# Patient Record
Sex: MALE | Race: WHITE | Employment: FULL TIME | URBAN - METROPOLITAN AREA
[De-identification: names, ages, dates, MRNs, and addresses within clinical notes are randomized per-mention and may not be internally consistent; named-entity substitution may affect disease eponyms.]

---

## 2018-06-11 ENCOUNTER — HOSPITAL ENCOUNTER (OUTPATIENT)
Dept: CARDIOLOGY | Age: 58
Discharge: HOME | End: 2018-06-11
Payer: OTHER GOVERNMENT

## 2018-06-11 ENCOUNTER — OFFICE VISIT (OUTPATIENT)
Dept: CARDIOLOGY CLINIC | Age: 58
End: 2018-06-11
Payer: OTHER GOVERNMENT

## 2018-06-11 VITALS
DIASTOLIC BLOOD PRESSURE: 80 MMHG | SYSTOLIC BLOOD PRESSURE: 126 MMHG | HEART RATE: 50 BPM | HEIGHT: 67 IN | BODY MASS INDEX: 46.62 KG/M2 | RESPIRATION RATE: 16 BRPM | WEIGHT: 297 LBS

## 2018-06-11 DIAGNOSIS — I35.8 AORTIC HEART MURMUR: ICD-10-CM

## 2018-06-11 DIAGNOSIS — R00.1 BRADYCARDIA: Primary | Chronic | ICD-10-CM

## 2018-06-11 PROCEDURE — 99213 OFFICE O/P EST LOW 20 MIN: CPT | Performed by: INTERNAL MEDICINE

## 2018-06-11 PROCEDURE — 93000 ELECTROCARDIOGRAM COMPLETE: CPT | Performed by: INTERNAL MEDICINE

## 2018-06-11 PROCEDURE — 93308 TTE F-UP OR LMTD: CPT

## 2018-06-13 ENCOUNTER — TELEPHONE (OUTPATIENT)
Dept: CARDIOLOGY CLINIC | Age: 58
End: 2018-06-13

## 2018-06-13 DIAGNOSIS — I35.0 AORTIC VALVE STENOSIS, ETIOLOGY OF CARDIAC VALVE DISEASE UNSPECIFIED: ICD-10-CM

## 2018-06-13 DIAGNOSIS — R06.02 SHORTNESS OF BREATH: Primary | ICD-10-CM

## 2018-06-18 ENCOUNTER — HOSPITAL ENCOUNTER (OUTPATIENT)
Age: 58
Discharge: HOME | End: 2018-06-18
Payer: OTHER GOVERNMENT

## 2018-06-18 LAB
ANION GAP SERPL CALCULATED.3IONS-SCNC: 13 MMOL/L (ref 7–16)
BUN BLDV-MCNC: 24 MG/DL (ref 6–20)
CALCIUM SERPL-MCNC: 9.4 MG/DL (ref 8.6–10.2)
CHLORIDE BLD-SCNC: 102 MMOL/L (ref 98–107)
CO2: 25 MMOL/L (ref 22–29)
CREAT SERPL-MCNC: 1.3 MG/DL (ref 0.7–1.2)
GFR AFRICAN AMERICAN: >60
GFR NON-AFRICAN AMERICAN: 57 ML/MIN/1.73
GLUCOSE BLD-MCNC: 92 MG/DL (ref 74–109)
POTASSIUM SERPL-SCNC: 4.1 MMOL/L (ref 3.5–5)
PRO-BNP: 114 PG/ML (ref 0–125)
SODIUM BLD-SCNC: 140 MMOL/L (ref 132–146)

## 2018-06-18 PROCEDURE — 80048 BASIC METABOLIC PNL TOTAL CA: CPT

## 2018-06-18 PROCEDURE — 83880 ASSAY OF NATRIURETIC PEPTIDE: CPT

## 2018-06-18 PROCEDURE — 36415 COLL VENOUS BLD VENIPUNCTURE: CPT

## 2018-06-26 ENCOUNTER — TELEPHONE (OUTPATIENT)
Dept: CARDIOLOGY CLINIC | Age: 58
End: 2018-06-26

## 2018-06-26 DIAGNOSIS — I35.0 AORTIC STENOSIS, SEVERE: Primary | ICD-10-CM

## 2018-06-29 ENCOUNTER — TELEPHONE (OUTPATIENT)
Dept: CARDIOLOGY CLINIC | Age: 58
End: 2018-06-29

## 2018-07-05 NOTE — TELEPHONE ENCOUNTER
I gave Dr. Dixon Stands email to [facility] on 6/29/18. She was to give this information to [facility] in echo department.

## 2018-07-12 ENCOUNTER — TELEPHONE (OUTPATIENT)
Dept: CARDIOLOGY CLINIC | Age: 58
End: 2018-07-12

## 2018-08-31 ENCOUNTER — TELEPHONE (OUTPATIENT)
Dept: ADMINISTRATIVE | Age: 58
End: 2018-08-31

## 2018-09-19 NOTE — PROGRESS NOTES
by mouth daily, Disp: , Rfl:     ferrous sulfate 325 (65 Fe) MG tablet, Take 325 mg by mouth daily (with breakfast), Disp: , Rfl:     aspirin 81 MG tablet, Take 81 mg by mouth daily, Disp: , Rfl:     CPAP Machine MISC, by Does not apply route, Disp: , Rfl:     levothyroxine (SYNTHROID) 137 MCG tablet, Take 150 mcg by mouth Daily , Disp: , Rfl:     FLUTICASONE FUROATE NA, 2 sprays by Nasal route daily. , Disp: , Rfl:     rosuvastatin (CRESTOR) 10 MG tablet, Take 10 mg by mouth daily. , Disp: , Rfl:     Cholecalciferol (VITAMIN D) 2000 UNITS CAPS capsule, Take 4,000 capsules by mouth daily , Disp: , Rfl:     albuterol (PROVENTIL) (2.5 MG/3ML) 0.083% nebulizer solution, Take 2.5 mg by nebulization every 6 hours as needed.   , Disp: , Rfl:       Ilya Gallego MD

## 2018-09-24 ENCOUNTER — OFFICE VISIT (OUTPATIENT)
Dept: CARDIOLOGY CLINIC | Age: 58
End: 2018-09-24
Payer: OTHER GOVERNMENT

## 2018-09-24 VITALS
SYSTOLIC BLOOD PRESSURE: 104 MMHG | HEART RATE: 57 BPM | BODY MASS INDEX: 43.41 KG/M2 | DIASTOLIC BLOOD PRESSURE: 62 MMHG | HEIGHT: 67 IN | WEIGHT: 276.6 LBS | RESPIRATION RATE: 18 BRPM

## 2018-09-24 DIAGNOSIS — R00.1 BRADYCARDIA: Primary | Chronic | ICD-10-CM

## 2018-09-24 DIAGNOSIS — Z95.2 S/P AORTIC VALVE REPLACEMENT: ICD-10-CM

## 2018-09-24 DIAGNOSIS — I35.0 AORTIC VALVE STENOSIS, ETIOLOGY OF CARDIAC VALVE DISEASE UNSPECIFIED: Chronic | ICD-10-CM

## 2018-09-24 PROCEDURE — 99213 OFFICE O/P EST LOW 20 MIN: CPT | Performed by: INTERNAL MEDICINE

## 2018-09-24 PROCEDURE — 93000 ELECTROCARDIOGRAM COMPLETE: CPT | Performed by: INTERNAL MEDICINE

## 2018-09-24 RX ORDER — FERROUS SULFATE 325(65) MG
325 TABLET ORAL
COMMUNITY
End: 2018-11-12 | Stop reason: ALTCHOICE

## 2018-09-24 RX ORDER — WARFARIN SODIUM 3 MG/1
3 TABLET ORAL 2 TIMES DAILY
COMMUNITY
Start: 2018-09-12 | End: 2018-11-12 | Stop reason: ALTCHOICE

## 2018-09-24 RX ORDER — ALBUTEROL SULFATE 90 MCG
HFA AEROSOL WITH ADAPTER (GRAM) INHALATION
COMMUNITY
Start: 2018-08-02

## 2018-11-01 NOTE — PROGRESS NOTES
[facility]  Radha Amato M.D. Ericka Jewell. Araceli Stanford M.D. Miriam Ledyardhussein Mendez M.D.  Eda Bence. Sandy Heart M.D. Clemencia Whitaker, Mirela Love M.D. Jesu Humphrey M.D. Oleksandr Hanson   1960  Nenita Betancourt MD    This 30-year-old man is seen for outpatient cardiac follow-up today. He has a history of obesity, hyperlipidemia, as well as valvular/ischemic heart disease. He had CABG and AVR at [facility] 08/29/18. He states he is asymptomatic currently and has been able to perform all his normal activities. Medical History:  1. Hyperlipidemia.    2. Hypothyroidism. 3. Asthma.    4. Vasectomy. 5. Cardiac catheterization [facility], 01/28/2005 (Dr. Rock Sparks). [facility] minimal plaque.  LAD mild lumen irregularities.   CX angiographically unremarkable.  RCA dominant with mild plaque.  No greater than 20% stenosis.   LVEDP 12.   Normal wall motion with EF 60%.    6. History of cigarette abuse, 15 pack years.  Quit 2008.    7. Echo, 11/14/2011.  LA 3.9.  EF 60%.  E/A 1. 18.  E/E' 11.79.  AV mean gradient 24.  NEREIDA 1.6 cm.  Concentric LVH.  Aortic root 3.9.   8. Exercise MPS, 11/21/2011.  14.8 METS, 85% MPHR.  Mild LV dilatation.  No TID.  Small reversible ischemic anteroseptal defect.  EF 63%. 9. Abdominal obesity.   BMI 46.5 to June 2018.      10. Exercise EKG, 02/18/2014.  10.4 METS.  No pain or arrhythmia.  No ST segment depression.  75% MPHR.  EKG indeterminate for ischemia due to submaximal heart rate. 11.  DION. Treated and followed by Dr. Rick Pearl, 06/07/2017. 12. Echo, 05/26/2015.   Mild CLVH.  Normal EF.  Moderately enlarged LA.  Calcific aortic stenosis moderate.   NEREIDA 1.3 cm², mean gradient 28.   Aortic root 4.2.    13. Echo, 08/21/2017. Normal EF. No pericardial effusion. Mild AI. Probably no severe AS. Valve not well demonstrated. EF normal.     14. Echo, 06/12/2018. AV not well demonstrated. AV peak velocity 4.1m/s. AV mean gradient 37 mmHg.  Aortic valve mouth daily, Disp: , Rfl:     aspirin 81 MG tablet, Take 81 mg by mouth daily, Disp: , Rfl:     CPAP Machine MISC, by Does not apply route, Disp: , Rfl:     levothyroxine (SYNTHROID) 137 MCG tablet, Take 150 mcg by mouth Daily , Disp: , Rfl:     FLUTICASONE FUROATE NA, 2 sprays by Nasal route daily. , Disp: , Rfl:     rosuvastatin (CRESTOR) 10 MG tablet, Take 10 mg by mouth daily. , Disp: , Rfl:     Cholecalciferol (VITAMIN D) 2000 UNITS CAPS capsule, Take 4,000 capsules by mouth daily , Disp: , Rfl:     albuterol (PROVENTIL) (2.5 MG/3ML) 0.083% nebulizer solution, Take 2.5 mg by nebulization every 6 hours as needed. , Disp: , Rfl:       Sudhir Gutierrez MD

## 2018-11-12 ENCOUNTER — OFFICE VISIT (OUTPATIENT)
Dept: CARDIOLOGY CLINIC | Age: 58
End: 2018-11-12
Payer: OTHER GOVERNMENT

## 2018-11-12 ENCOUNTER — HOSPITAL ENCOUNTER (OUTPATIENT)
Dept: CARDIOLOGY | Age: 58
Discharge: HOME | End: 2018-11-12
Payer: OTHER GOVERNMENT

## 2018-11-12 VITALS
SYSTOLIC BLOOD PRESSURE: 112 MMHG | RESPIRATION RATE: 12 BRPM | WEIGHT: 267 LBS | HEIGHT: 67 IN | BODY MASS INDEX: 41.91 KG/M2 | HEART RATE: 50 BPM | DIASTOLIC BLOOD PRESSURE: 78 MMHG

## 2018-11-12 DIAGNOSIS — Z95.2 S/P AORTIC VALVE REPLACEMENT: ICD-10-CM

## 2018-11-12 DIAGNOSIS — R00.1 BRADYCARDIA: Primary | Chronic | ICD-10-CM

## 2018-11-12 LAB
LV EF: 60 %
LVEF MODALITY: NORMAL

## 2018-11-12 PROCEDURE — 93306 TTE W/DOPPLER COMPLETE: CPT | Performed by: PSYCHIATRY & NEUROLOGY

## 2018-11-12 PROCEDURE — C8929 TTE W OR WO FOL WCON,DOPPLER: HCPCS | Performed by: PSYCHIATRY & NEUROLOGY

## 2018-11-12 PROCEDURE — 93000 ELECTROCARDIOGRAM COMPLETE: CPT | Performed by: INTERNAL MEDICINE

## 2018-11-12 PROCEDURE — 99213 OFFICE O/P EST LOW 20 MIN: CPT | Performed by: INTERNAL MEDICINE

## 2018-11-14 ENCOUNTER — TELEPHONE (OUTPATIENT)
Dept: CARDIOLOGY CLINIC | Age: 58
End: 2018-11-14

## 2019-11-26 ENCOUNTER — TELEPHONE (OUTPATIENT)
Dept: CARDIOLOGY CLINIC | Age: 59
End: 2019-11-26

## 2022-02-14 ENCOUNTER — APPOINTMENT (OUTPATIENT)
Dept: CT IMAGING | Age: 62
End: 2022-02-14
Payer: OTHER GOVERNMENT

## 2022-02-14 ENCOUNTER — HOSPITAL ENCOUNTER (EMERGENCY)
Age: 62
Discharge: ANOTHER HOSPITAL | End: 2022-02-15
Attending: EMERGENCY MEDICINE
Payer: OTHER GOVERNMENT

## 2022-02-14 DIAGNOSIS — I63.9 ACUTE ISCHEMIC STROKE (HCC): Primary | ICD-10-CM

## 2022-02-14 LAB
ALBUMIN SERPL-MCNC: 4.1 G/DL (ref 3.5–5.2)
ALP BLD-CCNC: 66 U/L (ref 40–129)
ALT SERPL-CCNC: 20 U/L (ref 0–40)
ANION GAP SERPL CALCULATED.3IONS-SCNC: 11 MMOL/L (ref 7–16)
APTT: 28.9 SEC (ref 24.5–35.1)
AST SERPL-CCNC: 16 U/L (ref 0–39)
BASOPHILS ABSOLUTE: 0.04 E9/L (ref 0–0.2)
BASOPHILS RELATIVE PERCENT: 0.5 % (ref 0–2)
BILIRUB SERPL-MCNC: <0.2 MG/DL (ref 0–1.2)
BUN BLDV-MCNC: 20 MG/DL (ref 6–23)
CALCIUM SERPL-MCNC: 9.1 MG/DL (ref 8.6–10.2)
CHLORIDE BLD-SCNC: 101 MMOL/L (ref 98–107)
CO2: 26 MMOL/L (ref 22–29)
CREAT SERPL-MCNC: 1.1 MG/DL (ref 0.7–1.2)
EOSINOPHILS ABSOLUTE: 0.11 E9/L (ref 0.05–0.5)
EOSINOPHILS RELATIVE PERCENT: 1.4 % (ref 0–6)
GFR AFRICAN AMERICAN: >60
GFR NON-AFRICAN AMERICAN: >60 ML/MIN/1.73
GLUCOSE BLD-MCNC: 140 MG/DL (ref 74–99)
HCT VFR BLD CALC: 44.8 % (ref 37–54)
HEMOGLOBIN: 14.6 G/DL (ref 12.5–16.5)
IMMATURE GRANULOCYTES #: 0.02 E9/L
IMMATURE GRANULOCYTES %: 0.3 % (ref 0–5)
INR BLD: 1
LYMPHOCYTES ABSOLUTE: 1.84 E9/L (ref 1.5–4)
LYMPHOCYTES RELATIVE PERCENT: 24.1 % (ref 20–42)
MCH RBC QN AUTO: 33.2 PG (ref 26–35)
MCHC RBC AUTO-ENTMCNC: 32.6 % (ref 32–34.5)
MCV RBC AUTO: 101.8 FL (ref 80–99.9)
METER GLUCOSE: 135 MG/DL (ref 74–99)
MONOCYTES ABSOLUTE: 0.82 E9/L (ref 0.1–0.95)
MONOCYTES RELATIVE PERCENT: 10.8 % (ref 2–12)
NEUTROPHILS ABSOLUTE: 4.79 E9/L (ref 1.8–7.3)
NEUTROPHILS RELATIVE PERCENT: 62.9 % (ref 43–80)
PDW BLD-RTO: 13.1 FL (ref 11.5–15)
PLATELET # BLD: 164 E9/L (ref 130–450)
PMV BLD AUTO: 10.1 FL (ref 7–12)
POTASSIUM SERPL-SCNC: 4.3 MMOL/L (ref 3.5–5)
PROTHROMBIN TIME: 11 SEC (ref 9.3–12.4)
RBC # BLD: 4.4 E12/L (ref 3.8–5.8)
REASON FOR REJECTION: NORMAL
REJECTED TEST: NORMAL
SARS-COV-2, NAAT: NOT DETECTED
SODIUM BLD-SCNC: 138 MMOL/L (ref 132–146)
TOTAL PROTEIN: 6.7 G/DL (ref 6.4–8.3)
TSH SERPL DL<=0.05 MIU/L-ACNC: 1.6 UIU/ML (ref 0.27–4.2)
WBC # BLD: 7.6 E9/L (ref 4.5–11.5)

## 2022-02-14 PROCEDURE — 6360000002 HC RX W HCPCS: Performed by: EMERGENCY MEDICINE

## 2022-02-14 PROCEDURE — 36415 COLL VENOUS BLD VENIPUNCTURE: CPT

## 2022-02-14 PROCEDURE — 93005 ELECTROCARDIOGRAM TRACING: CPT | Performed by: PHYSICIAN ASSISTANT

## 2022-02-14 PROCEDURE — 96366 THER/PROPH/DIAG IV INF ADDON: CPT

## 2022-02-14 PROCEDURE — 85025 COMPLETE CBC W/AUTO DIFF WBC: CPT

## 2022-02-14 PROCEDURE — 87635 SARS-COV-2 COVID-19 AMP PRB: CPT

## 2022-02-14 PROCEDURE — 99448 NTRPROF PH1/NTRNET/EHR 21-30: CPT | Performed by: PSYCHIATRY & NEUROLOGY

## 2022-02-14 PROCEDURE — 70450 CT HEAD/BRAIN W/O DYE: CPT

## 2022-02-14 PROCEDURE — 70496 CT ANGIOGRAPHY HEAD: CPT

## 2022-02-14 PROCEDURE — 96365 THER/PROPH/DIAG IV INF INIT: CPT

## 2022-02-14 PROCEDURE — 85610 PROTHROMBIN TIME: CPT

## 2022-02-14 PROCEDURE — 84443 ASSAY THYROID STIM HORMONE: CPT

## 2022-02-14 PROCEDURE — 70498 CT ANGIOGRAPHY NECK: CPT

## 2022-02-14 PROCEDURE — 99285 EMERGENCY DEPT VISIT HI MDM: CPT

## 2022-02-14 PROCEDURE — 82962 GLUCOSE BLOOD TEST: CPT

## 2022-02-14 PROCEDURE — 0042T CT BRAIN PERFUSION: CPT

## 2022-02-14 PROCEDURE — 96375 TX/PRO/DX INJ NEW DRUG ADDON: CPT

## 2022-02-14 PROCEDURE — 6360000004 HC RX CONTRAST MEDICATION: Performed by: RADIOLOGY

## 2022-02-14 PROCEDURE — 80053 COMPREHEN METABOLIC PANEL: CPT

## 2022-02-14 PROCEDURE — 2580000003 HC RX 258: Performed by: EMERGENCY MEDICINE

## 2022-02-14 PROCEDURE — 85730 THROMBOPLASTIN TIME PARTIAL: CPT

## 2022-02-14 RX ORDER — LEVOTHYROXINE SODIUM 0.15 MG/1
150 TABLET ORAL DAILY
Status: CANCELLED | OUTPATIENT
Start: 2022-02-15

## 2022-02-14 RX ORDER — SODIUM CHLORIDE 9 MG/ML
25 INJECTION, SOLUTION INTRAVENOUS PRN
Status: DISCONTINUED | OUTPATIENT
Start: 2022-02-14 | End: 2022-02-15 | Stop reason: HOSPADM

## 2022-02-14 RX ORDER — SODIUM CHLORIDE 0.9 % (FLUSH) 0.9 %
5-40 SYRINGE (ML) INJECTION PRN
Status: DISCONTINUED | OUTPATIENT
Start: 2022-02-14 | End: 2022-02-15 | Stop reason: HOSPADM

## 2022-02-14 RX ORDER — POLYETHYLENE GLYCOL 3350 17 G/17G
17 POWDER, FOR SOLUTION ORAL DAILY PRN
Status: CANCELLED | OUTPATIENT
Start: 2022-02-14

## 2022-02-14 RX ORDER — HYDRALAZINE HYDROCHLORIDE 20 MG/ML
10 INJECTION INTRAMUSCULAR; INTRAVENOUS EVERY 30 MIN PRN
Status: DISCONTINUED | OUTPATIENT
Start: 2022-02-14 | End: 2022-02-15 | Stop reason: HOSPADM

## 2022-02-14 RX ORDER — ACETAMINOPHEN 650 MG/1
650 SUPPOSITORY RECTAL EVERY 4 HOURS PRN
Status: CANCELLED | OUTPATIENT
Start: 2022-02-14

## 2022-02-14 RX ORDER — SODIUM CHLORIDE 0.9 % (FLUSH) 0.9 %
5-40 SYRINGE (ML) INJECTION EVERY 12 HOURS SCHEDULED
Status: DISCONTINUED | OUTPATIENT
Start: 2022-02-14 | End: 2022-02-15 | Stop reason: HOSPADM

## 2022-02-14 RX ORDER — ACETAMINOPHEN 325 MG/1
650 TABLET ORAL EVERY 4 HOURS PRN
Status: CANCELLED | OUTPATIENT
Start: 2022-02-14

## 2022-02-14 RX ORDER — ALBUTEROL SULFATE 2.5 MG/3ML
2.5 SOLUTION RESPIRATORY (INHALATION) EVERY 6 HOURS PRN
Status: CANCELLED | OUTPATIENT
Start: 2022-02-14

## 2022-02-14 RX ORDER — ROSUVASTATIN CALCIUM 10 MG/1
10 TABLET, COATED ORAL DAILY
Status: CANCELLED | OUTPATIENT
Start: 2022-02-15

## 2022-02-14 RX ORDER — LORAZEPAM 2 MG/ML
2 INJECTION INTRAMUSCULAR ONCE
Status: COMPLETED | OUTPATIENT
Start: 2022-02-14 | End: 2022-02-14

## 2022-02-14 RX ORDER — ASPIRIN 300 MG/1
300 SUPPOSITORY RECTAL DAILY
Status: CANCELLED | OUTPATIENT
Start: 2022-02-15

## 2022-02-14 RX ORDER — DEXTROSE MONOHYDRATE 25 G/50ML
12.5 INJECTION, SOLUTION INTRAVENOUS
Status: ACTIVE | OUTPATIENT
Start: 2022-02-14 | End: 2022-02-14

## 2022-02-14 RX ORDER — ASPIRIN 81 MG/1
81 TABLET ORAL DAILY
Status: CANCELLED | OUTPATIENT
Start: 2022-02-15

## 2022-02-14 RX ORDER — 0.9 % SODIUM CHLORIDE 0.9 %
50 INTRAVENOUS SOLUTION INTRAVENOUS ONCE
Status: COMPLETED | OUTPATIENT
Start: 2022-02-14 | End: 2022-02-14

## 2022-02-14 RX ADMIN — ALTEPLASE 81 MG: KIT at 20:28

## 2022-02-14 RX ADMIN — IOPAMIDOL 110 ML: 755 INJECTION, SOLUTION INTRAVENOUS at 19:28

## 2022-02-14 RX ADMIN — ALTEPLASE 9 MG: KIT at 20:25

## 2022-02-14 RX ADMIN — SODIUM CHLORIDE 50 ML: 9 INJECTION, SOLUTION INTRAVENOUS at 20:30

## 2022-02-14 RX ADMIN — LORAZEPAM 2 MG: 2 INJECTION INTRAMUSCULAR at 19:51

## 2022-02-15 ENCOUNTER — HOSPITAL ENCOUNTER (INPATIENT)
Age: 62
LOS: 1 days | Discharge: HOME | DRG: 062 | End: 2022-02-16
Attending: INTERNAL MEDICINE | Admitting: INTERNAL MEDICINE
Payer: OTHER GOVERNMENT

## 2022-02-15 ENCOUNTER — APPOINTMENT (OUTPATIENT)
Dept: CT IMAGING | Age: 62
DRG: 062 | End: 2022-02-15
Attending: INTERNAL MEDICINE
Payer: OTHER GOVERNMENT

## 2022-02-15 VITALS
BODY MASS INDEX: 43.35 KG/M2 | WEIGHT: 286 LBS | RESPIRATION RATE: 16 BRPM | DIASTOLIC BLOOD PRESSURE: 80 MMHG | HEART RATE: 52 BPM | TEMPERATURE: 97.6 F | OXYGEN SATURATION: 99 % | SYSTOLIC BLOOD PRESSURE: 143 MMHG | HEIGHT: 68 IN

## 2022-02-15 DIAGNOSIS — I63.9 ACUTE ISCHEMIC STROKE (HCC): Primary | ICD-10-CM

## 2022-02-15 LAB
ALBUMIN SERPL-MCNC: 4.1 G/DL (ref 3.5–5.2)
ALP BLD-CCNC: 65 U/L (ref 40–129)
ALT SERPL-CCNC: 18 U/L (ref 0–40)
ANION GAP SERPL CALCULATED.3IONS-SCNC: 14 MMOL/L (ref 7–16)
AST SERPL-CCNC: 20 U/L (ref 0–39)
BASOPHILS ABSOLUTE: 0.04 E9/L (ref 0–0.2)
BASOPHILS RELATIVE PERCENT: 0.5 % (ref 0–2)
BILIRUB SERPL-MCNC: 0.4 MG/DL (ref 0–1.2)
BUN BLDV-MCNC: 16 MG/DL (ref 6–23)
CALCIUM SERPL-MCNC: 9 MG/DL (ref 8.6–10.2)
CHLORIDE BLD-SCNC: 104 MMOL/L (ref 98–107)
CHOLESTEROL, TOTAL: 153 MG/DL (ref 0–199)
CO2: 25 MMOL/L (ref 22–29)
CREAT SERPL-MCNC: 1.1 MG/DL (ref 0.7–1.2)
EKG ATRIAL RATE: 50 BPM
EKG P AXIS: 14 DEGREES
EKG P-R INTERVAL: 254 MS
EKG Q-T INTERVAL: 446 MS
EKG QRS DURATION: 88 MS
EKG QTC CALCULATION (BAZETT): 406 MS
EKG R AXIS: 6 DEGREES
EKG T AXIS: 71 DEGREES
EKG VENTRICULAR RATE: 50 BPM
EOSINOPHILS ABSOLUTE: 0.09 E9/L (ref 0.05–0.5)
EOSINOPHILS RELATIVE PERCENT: 1.2 % (ref 0–6)
GFR AFRICAN AMERICAN: >60
GFR NON-AFRICAN AMERICAN: >60 ML/MIN/1.73
GLUCOSE BLD-MCNC: 131 MG/DL (ref 74–99)
HBA1C MFR BLD: 6 % (ref 4–5.6)
HCT VFR BLD CALC: 43.3 % (ref 37–54)
HDLC SERPL-MCNC: 46 MG/DL
HEMOGLOBIN: 13.8 G/DL (ref 12.5–16.5)
IMMATURE GRANULOCYTES #: 0.03 E9/L
IMMATURE GRANULOCYTES %: 0.4 % (ref 0–5)
LDL CHOLESTEROL CALCULATED: 84 MG/DL (ref 0–99)
LYMPHOCYTES ABSOLUTE: 1.45 E9/L (ref 1.5–4)
LYMPHOCYTES RELATIVE PERCENT: 19.1 % (ref 20–42)
MAGNESIUM: 2 MG/DL (ref 1.6–2.6)
MCH RBC QN AUTO: 32.7 PG (ref 26–35)
MCHC RBC AUTO-ENTMCNC: 31.9 % (ref 32–34.5)
MCV RBC AUTO: 102.6 FL (ref 80–99.9)
MONOCYTES ABSOLUTE: 0.51 E9/L (ref 0.1–0.95)
MONOCYTES RELATIVE PERCENT: 6.7 % (ref 2–12)
NEUTROPHILS ABSOLUTE: 5.46 E9/L (ref 1.8–7.3)
NEUTROPHILS RELATIVE PERCENT: 72.1 % (ref 43–80)
PDW BLD-RTO: 13.2 FL (ref 11.5–15)
PHOSPHORUS: 2.4 MG/DL (ref 2.5–4.5)
PLATELET # BLD: 147 E9/L (ref 130–450)
PMV BLD AUTO: 10 FL (ref 7–12)
POTASSIUM SERPL-SCNC: 3.9 MMOL/L (ref 3.5–5)
RBC # BLD: 4.22 E12/L (ref 3.8–5.8)
SODIUM BLD-SCNC: 143 MMOL/L (ref 132–146)
TOTAL PROTEIN: 6.9 G/DL (ref 6.4–8.3)
TRIGL SERPL-MCNC: 113 MG/DL (ref 0–149)
VLDLC SERPL CALC-MCNC: 23 MG/DL
WBC # BLD: 7.6 E9/L (ref 4.5–11.5)

## 2022-02-15 PROCEDURE — 6370000000 HC RX 637 (ALT 250 FOR IP): Performed by: NURSE PRACTITIONER

## 2022-02-15 PROCEDURE — 6360000002 HC RX W HCPCS: Performed by: SURGERY

## 2022-02-15 PROCEDURE — 2580000003 HC RX 258

## 2022-02-15 PROCEDURE — 84100 ASSAY OF PHOSPHORUS: CPT

## 2022-02-15 PROCEDURE — 93010 ELECTROCARDIOGRAM REPORT: CPT | Performed by: INTERNAL MEDICINE

## 2022-02-15 PROCEDURE — 2000000000 HC ICU R&B

## 2022-02-15 PROCEDURE — APPSS60 APP SPLIT SHARED TIME 46-60 MINUTES: Performed by: NURSE PRACTITIONER

## 2022-02-15 PROCEDURE — 83036 HEMOGLOBIN GLYCOSYLATED A1C: CPT

## 2022-02-15 PROCEDURE — 36415 COLL VENOUS BLD VENIPUNCTURE: CPT

## 2022-02-15 PROCEDURE — 85025 COMPLETE CBC W/AUTO DIFF WBC: CPT

## 2022-02-15 PROCEDURE — 80061 LIPID PANEL: CPT

## 2022-02-15 PROCEDURE — 83735 ASSAY OF MAGNESIUM: CPT

## 2022-02-15 PROCEDURE — 99223 1ST HOSP IP/OBS HIGH 75: CPT | Performed by: CLINICAL NURSE SPECIALIST

## 2022-02-15 PROCEDURE — 80053 COMPREHEN METABOLIC PANEL: CPT

## 2022-02-15 PROCEDURE — 70450 CT HEAD/BRAIN W/O DYE: CPT

## 2022-02-15 RX ORDER — ROSUVASTATIN CALCIUM 10 MG/1
10 TABLET, COATED ORAL DAILY
Status: DISCONTINUED | OUTPATIENT
Start: 2022-02-15 | End: 2022-02-15

## 2022-02-15 RX ORDER — FLUTICASONE PROPIONATE 50 MCG
2 SPRAY, SUSPENSION (ML) NASAL DAILY
Status: DISCONTINUED | OUTPATIENT
Start: 2022-02-15 | End: 2022-02-16 | Stop reason: HOSPADM

## 2022-02-15 RX ORDER — ASPIRIN 81 MG/1
81 TABLET ORAL DAILY
Status: DISCONTINUED | OUTPATIENT
Start: 2022-02-15 | End: 2022-02-16 | Stop reason: HOSPADM

## 2022-02-15 RX ORDER — LORAZEPAM 2 MG/ML
2 INJECTION INTRAMUSCULAR ONCE
Status: COMPLETED | OUTPATIENT
Start: 2022-02-15 | End: 2022-02-15

## 2022-02-15 RX ORDER — CLOPIDOGREL BISULFATE 75 MG/1
75 TABLET ORAL DAILY
Status: DISCONTINUED | OUTPATIENT
Start: 2022-02-15 | End: 2022-02-16 | Stop reason: HOSPADM

## 2022-02-15 RX ORDER — ALBUTEROL SULFATE 2.5 MG/3ML
2.5 SOLUTION RESPIRATORY (INHALATION) EVERY 6 HOURS PRN
Status: DISCONTINUED | OUTPATIENT
Start: 2022-02-15 | End: 2022-02-16 | Stop reason: HOSPADM

## 2022-02-15 RX ORDER — ACETAMINOPHEN 325 MG/1
650 TABLET ORAL EVERY 4 HOURS PRN
Status: DISCONTINUED | OUTPATIENT
Start: 2022-02-15 | End: 2022-02-16 | Stop reason: HOSPADM

## 2022-02-15 RX ORDER — ACETAMINOPHEN 650 MG/1
650 SUPPOSITORY RECTAL EVERY 4 HOURS PRN
Status: DISCONTINUED | OUTPATIENT
Start: 2022-02-15 | End: 2022-02-16 | Stop reason: HOSPADM

## 2022-02-15 RX ORDER — ASPIRIN 300 MG/1
300 SUPPOSITORY RECTAL DAILY
Status: DISCONTINUED | OUTPATIENT
Start: 2022-02-15 | End: 2022-02-16 | Stop reason: HOSPADM

## 2022-02-15 RX ORDER — POLYETHYLENE GLYCOL 3350 17 G/17G
17 POWDER, FOR SOLUTION ORAL DAILY PRN
Status: DISCONTINUED | OUTPATIENT
Start: 2022-02-15 | End: 2022-02-16 | Stop reason: HOSPADM

## 2022-02-15 RX ORDER — LEVOTHYROXINE SODIUM 0.07 MG/1
150 TABLET ORAL DAILY
Status: DISCONTINUED | OUTPATIENT
Start: 2022-02-16 | End: 2022-02-16 | Stop reason: HOSPADM

## 2022-02-15 RX ORDER — ROSUVASTATIN CALCIUM 20 MG/1
40 TABLET, COATED ORAL DAILY
Status: DISCONTINUED | OUTPATIENT
Start: 2022-02-16 | End: 2022-02-16 | Stop reason: HOSPADM

## 2022-02-15 RX ORDER — HYDRALAZINE HYDROCHLORIDE 20 MG/ML
10 INJECTION INTRAMUSCULAR; INTRAVENOUS EVERY 10 MIN PRN
Status: DISCONTINUED | OUTPATIENT
Start: 2022-02-15 | End: 2022-02-16 | Stop reason: HOSPADM

## 2022-02-15 RX ORDER — SODIUM CHLORIDE 0.9 % (FLUSH) 0.9 %
SYRINGE (ML) INJECTION
Status: COMPLETED
Start: 2022-02-15 | End: 2022-02-15

## 2022-02-15 RX ORDER — SENNA PLUS 8.6 MG/1
1 TABLET ORAL NIGHTLY
Status: DISCONTINUED | OUTPATIENT
Start: 2022-02-15 | End: 2022-02-16 | Stop reason: HOSPADM

## 2022-02-15 RX ORDER — POLYETHYLENE GLYCOL 3350 17 G/17G
17 POWDER, FOR SOLUTION ORAL DAILY
Status: DISCONTINUED | OUTPATIENT
Start: 2022-02-15 | End: 2022-02-16 | Stop reason: HOSPADM

## 2022-02-15 RX ADMIN — LORAZEPAM 2 MG: 2 INJECTION INTRAMUSCULAR; INTRAVENOUS at 20:59

## 2022-02-15 RX ADMIN — POTASSIUM & SODIUM PHOSPHATES POWDER PACK 280-160-250 MG 500 MG: 280-160-250 PACK at 15:45

## 2022-02-15 RX ADMIN — SODIUM CHLORIDE, PRESERVATIVE FREE 10 ML: 5 INJECTION INTRAVENOUS at 20:59

## 2022-02-15 ASSESSMENT — PAIN SCALES - GENERAL
PAINLEVEL_OUTOF10: 0

## 2022-02-15 NOTE — ED NOTES
Bed: 05  Expected date:   Expected time:   Means of arrival:   Comments:  Held triage     Hugo Davalos, KIMBERLY  02/14/22 [id number]

## 2022-02-15 NOTE — ED NOTES
Pt accepted [facility]'s main- [address] [facility] room- nurse to nurse [phone]     Junella Oppenheim, RN  02/15/22 0792

## 2022-02-15 NOTE — ED NOTES
N2N called to [facility], RN.       Sara Lyles RN  02/15/22 1189

## 2022-02-15 NOTE — ED PROVIDER NOTES
Department of Emergency Medicine   ED  Provider Note  Admit Date/RoomTime: 2022  7:07 PM  ED Room:     NAME: Kassy Mitchell  : 1960  MRN: [id number]     Chief Complaint:  Aphasia (20 Mins PTA)    History of Present Illness     LAST KNOWN WELL TIME & DATE: Today @ 6:50 PM       Kassy Mitchell is a 57 y.o. old male who presents to the emergency department for evaluation of strokelike symptoms. He was sitting with his family when suddenly at 6:50 PM, he had right-sided facial droop and slurred speech. His right leg is a bit weak as well. He denies any visual disturbance. No confusion. No head trauma or falls. He does not take any blood thinning medications. He does have a history of CABG several years ago. He states he is not always compliant with his baby aspirin. He has had no recent surgical procedures and no history of intracranial hemorrhage. Code Status on file: No Order. .  ROS   Pertinent positives and negatives are stated within HPI, all other systems reviewed and are negative. Past Medical History:  has a past medical history of Aortic stenosis, Asthma, Bradycardia, Hx of vasectomy, Hyperlipidemia, Hypothyroidism, Sleep apnea, and Thyroid disease. Surgical History:  has a past surgical history that includes Vasectomy. Social History:  reports that he quit smoking about 14 years ago. His smoking use included cigarettes. He has a 7.50 pack-year smoking history. He has never used smokeless tobacco. He reports that he does not drink alcohol and does not use drugs. Family History: family history includes Emphysema in his father; Heart Disease in his mother. Allergies: Patient has no known allergies.     Physical Exam   Oxygen Saturation Interpretation: Normal.        ED Triage Vitals   BP Temp Temp src Pulse Resp SpO2 Height Weight   22 -- 22 -- --   (!) 162/89 96.2 °F (35.7 °C)  53 16 99 % Constitutional:   Level of Consciousness: Awake and alert. ETOH: No.         Distress: none. Cooperativeness: cooperative. Eyes:  PERRL, EOMI, no discharge or conjunctival injection. Ears:  External ears without lesions. Throat:  Pharynx without injection, exudate, or tonsillar hypertrophy. Airway patient. Neck:  Normal ROM. Supple. Respiratory:  Clear to auscultation and breath sounds equal.  CV:  Regular rate and rhythm, normal heart sounds, without pathological murmurs, ectopy, gallops, or rubs. GI: Abdomen Soft, nontender, good bowel sounds. No firm or pulsatile mass. Back:  No costovertebral tenderness. Integument:  Normal turgor. Warm, dry, without visible rash, unless noted elsewhere. Lymphatic: no lymphadenopathy noted  Neurological:       Orientation: person, place and time. Memory:             short term impairment: No.             Long term impairment: No.       CN II-XII: V: Facial sensation:  abnormal - R  VII: Facial strength: abnormal - R. Motor function:            Arm(s): Bilateral normal.            Leg(s): Right weak. Cerebellar function:            Tremor: No.              Past-pointing: No.             Limb Ataxia: No..       Sensory function:            Arm(s): Bilateral normal.            Leg(s): Right diminished. Face: Bilateral normal.    NIH Stroke Scale/Score at time of initial evaluation:  1A: Level of Consciousness 0 - alert; keenly responsive   1B: Ask Month and Age 0 - answers both questions correctly   1C:  Tell Patient To Open and Close Eyes, then Hand  Squeeze 0 - performs both tasks correctly   2: Test Horizontal Extraocular Movements 0 - normal   3: Test Visual Fields 0 - no visual loss   4: Test Facial Palsy 1 - minor paralysis (flattened nasolabial fold, asymmetric on smiling)   5A: Test Left Arm Motor Drift 0 - no drift, limb holds 90 (or 45) degrees for full 10 seconds   5B: Test Right Arm Motor Drift 0 - no Immature Granulocytes # 0.02 E9/L    Lymphocytes Absolute 1.84 1.50 - 4.00 E9/L    Monocytes Absolute 0.82 0.10 - 0.95 E9/L    Eosinophils Absolute 0.11 0.05 - 0.50 E9/L    Basophils Absolute 0.04 0.00 - 0.20 E9/L   Protime-INR   Result Value Ref Range    Protime 11.0 9.3 - 12.4 sec    INR 1.0    APTT   Result Value Ref Range    aPTT 28.9 24.5 - 35.1 sec   SPECIMEN REJECTION   Result Value Ref Range    Rejected Test cmp/tsh     Reason for Rejection see below    Comprehensive metabolic panel   Result Value Ref Range    Sodium 138 132 - 146 mmol/L    Potassium 4.3 3.5 - 5.0 mmol/L    Chloride 101 98 - 107 mmol/L    CO2 26 22 - 29 mmol/L    Anion Gap 11 7 - 16 mmol/L    Glucose 140 (H) 74 - 99 mg/dL    BUN 20 6 - 23 mg/dL    CREATININE 1.1 0.7 - 1.2 mg/dL    GFR Non-African American >60 >=60 mL/min/1.73    GFR African American >60     Calcium 9.1 8.6 - 10.2 mg/dL    Total Protein 6.7 6.4 - 8.3 g/dL    Albumin 4.1 3.5 - 5.2 g/dL    Total Bilirubin <0.2 0.0 - 1.2 mg/dL    Alkaline Phosphatase 66 40 - 129 U/L    ALT 20 0 - 40 U/L    AST 16 0 - 39 U/L   TSH WITHOUT REFLEX   Result Value Ref Range    TSH 1.600 0.270 - 4.200 uIU/mL   POCT Glucose   Result Value Ref Range    Meter Glucose 135 (H) 74 - 99 mg/dL     Imaging: All Radiology results interpreted by Radiologist unless otherwise noted. CTA NECK W CONTRAST   Final Result   CT Head:      No acute intracranial hemorrhage or mass effect. No CT evidence of large acute infarct. CT Perfusion:      There is 38 cc of parenchyma in the left superior posterior frontal lobe with   mild ischemia (T-max > 4 seconds). No evidence of T-max > 6 seconds. No   perfusion evidence of infarct core. CTA Neck:      No hemodynamically significant stenosis or dissection of the cervical   internal carotid arteries. No high-grade stenosis or dissection of the cervical vertebral arteries.       CTA Head:      No evidence of proximal large vessel arterial occlusion or high-grade   stenosis. Critical results were called by Dr. Sylvia Arias MD to Dr. Hans Jimenez on   2/14/2022 at 17:05. Other:      The visualized ascending aorta appears enlarged; consider CT chest for   further evaluation. There has been prior CABG. CTA HEAD W CONTRAST   Final Result   CT Head:      No acute intracranial hemorrhage or mass effect. No CT evidence of large acute infarct. CT Perfusion:      There is 38 cc of parenchyma in the left superior posterior frontal lobe with   mild ischemia (T-max > 4 seconds). No evidence of T-max > 6 seconds. No   perfusion evidence of infarct core. CTA Neck:      No hemodynamically significant stenosis or dissection of the cervical   internal carotid arteries. No high-grade stenosis or dissection of the cervical vertebral arteries. CTA Head:      No evidence of proximal large vessel arterial occlusion or high-grade   stenosis. Critical results were called by Dr. Sylvia Arias MD to Dr. Hans Jimenez on   2/14/2022 at 17:05. Other:      The visualized ascending aorta appears enlarged; consider CT chest for   further evaluation. There has been prior CABG. CT BRAIN PERFUSION   Final Result   CT Head:      No acute intracranial hemorrhage or mass effect. No CT evidence of large acute infarct. CT Perfusion:      There is 38 cc of parenchyma in the left superior posterior frontal lobe with   mild ischemia (T-max > 4 seconds). No evidence of T-max > 6 seconds. No   perfusion evidence of infarct core. CTA Neck:      No hemodynamically significant stenosis or dissection of the cervical   internal carotid arteries. No high-grade stenosis or dissection of the cervical vertebral arteries. CTA Head:      No evidence of proximal large vessel arterial occlusion or high-grade   stenosis. Critical results were called by Dr. Sylvia Arias MD to Dr. Hans Jimenez on   2/14/2022 at 17:05. Other:      The visualized ascending aorta appears enlarged; consider CT chest for   further evaluation. There has been prior CABG. CT HEAD WO CONTRAST   Final Result   CT Head:      No acute intracranial hemorrhage or mass effect. No CT evidence of large acute infarct. CT Perfusion:      There is 38 cc of parenchyma in the left superior posterior frontal lobe with   mild ischemia (T-max > 4 seconds). No evidence of T-max > 6 seconds. No   perfusion evidence of infarct core. CTA Neck:      No hemodynamically significant stenosis or dissection of the cervical   internal carotid arteries. No high-grade stenosis or dissection of the cervical vertebral arteries. CTA Head:      No evidence of proximal large vessel arterial occlusion or high-grade   stenosis. Critical results were called by Dr. Iain Pavon MD to Dr. Nikki Bailey on   2/14/2022 at 17:05. Other:      The visualized ascending aorta appears enlarged; consider CT chest for   further evaluation. There has been prior CABG. EKG #1:  Interpreted by emergency department attending physician unless otherwise noted. 2/14/22  Time: 19:56    Rhythm: normal sinus  and 1 degree AV block  Rate: 50  Axis: normal  Conduction: normal  ST Segments: no acute change  T Waves: non specific changes    Clinical Impression: non-specific EKG  Comparison to Prior tracings:  Today's ECG is unchanged from previous tracings.     ED Course / Medical Decision Making     Medications   sodium chloride flush 0.9 % injection 5-40 mL (has no administration in time range)   sodium chloride flush 0.9 % injection 5-40 mL (has no administration in time range)   0.9 % sodium chloride infusion (has no administration in time range)   dextrose 50 % IV solution (has no administration in time range)   hydrALAZINE (APRESOLINE) injection 10 mg (has no administration in time range)   iopamidol (ISOVUE-370) 76 % injection 110 mL (110 mLs IntraVENous Given 2/14/22 1928)   LORazepam (ATIVAN) injection 2 mg (2 mg IntraVENous Given 2/14/22 1951)   alteplase (ACTIVASE) injection 9 mg (9 mg IntraVENous New Bag 2/14/22 2025)     Followed by   alteplase (ACTIVASE) injection 81 mg (0 mg IntraVENous Stopped 2/14/22 2245)     Followed by   0.9 % sodium chloride bolus (0 mLs IntraVENous Stopped 2/14/22 2315)        Re-Evaluations:  2/14/22      Patients symptoms are improving. 9:30PM    NIH Stroke Scale/Score at time of initial evaluation:  1A: Level of Consciousness 0 - alert; keenly responsive   1B: Ask Month and Age 0 - answers both questions correctly   1C: Tell Patient To Open and Close Eyes, then Hand  Squeeze 0 - performs both tasks correctly   2: Test Horizontal Extraocular Movements 0 - normal   3: Test Visual Fields 0 - no visual loss   4: Test Facial Palsy 0 - normal symmetric movement   5A: Test Left Arm Motor Drift 0 - no drift, limb holds 90 (or 45) degrees for full 10 seconds   5B: Test Right Arm Motor Drift 0 - no drift, limb holds 90 (or 45) degrees for full 10 seconds   6A: Test Left Leg Motor Drift 0 - no drift; leg holds 30 degree position for full 5 seconds   6B: Test Right Leg Motor Drift 0 - no drift; leg holds 30 degree position for full 5 seconds   7: Test Limb Ataxia   (FNF/Heel-Shin) 0 - absent   8: Test Sensation 0 - normal; no sensory loss   9: Test Language/Aphasia 0 - no aphasia, normal   10: Test Dysarthria 0 - normal   11: Test Extinction/Inattention 0 - no abnormality   Total Score: 0     Consultations:             IP CONSULT TO NEUROLOGY  IP CONSULT TO PHARMACY  PHARMACY TO CHANGE BASE FLUIDS    Procedures:   none    MDM: Patient presents to the ED for evaluation of strokelike symptoms. He had sudden onset dysarthria, right-sided facial droop and numbness as well as some right leg weakness. Initial NIH was 4. Stroke team was called and patient taken immediately to CT.   I consulted with the stroke neurologist, please see their separate note. Patient was consented for TPA and it was administered once dry CT read was resulted. On reevaluation, patient symptoms have resolved. Repeat NIH is 0. Patient accepted to ICU at [facility] for further management. CRITICAL CARE:   75 MINUTES. Please note that the withdrawal or failure to initiate urgent interventions for this patient would likely result in a life threatening deterioration or permanent disability. Accordingly this patient received the above mentioned time, excluding separately billable procedures. Plan of Care/Counseling:  Theron Wall DO reviewed today's visit with the patient in addition to providing specific details for the plan of care and counseling regarding the diagnosis and prognosis. Questions are answered at this time and are agreeable with the plan. Assessment      1. Acute ischemic stroke [facility])      This patient's ED course included: a personal history and physicial examination  This patient has remained hemodynamically stable during their ED course. Plan   Admit to Neuro ICU. Patient condition is stable. New Medications     New Prescriptions    No medications on file     Electronically signed by Theron Wall DO   DD: 2/14/22  **This report was transcribed using voice recognition software. Every effort was made to ensure accuracy; however, inadvertent computerized transcription errors may be present.   END OF PROVIDER NOTE        Theron Wall DO  02/14/22 [id number]

## 2022-02-15 NOTE — PROGRESS NOTES
1922 called in stroke alert to access center  [facility] [address] [facility]

## 2022-02-15 NOTE — FLOWSHEET NOTE
Patient arrived from [facility], walked to bed placed on monitor, VSS. Elena DUARTE notified.

## 2022-02-15 NOTE — ED NOTES
Called from ct, pt is claustrophobic. Spoke with dr. Elo Mackenzie- ordered 2mg ativan.  Spoke with patient, gave medication- able to perform the test.     Frederic Willams RN  02/14/22 1950

## 2022-02-15 NOTE — PROGRESS NOTES
Intensive Care Unit  Critical Care Consult  Daily Progress Note 2/15/2022    Date of Admission: 2/15/2022    CC: Critical care management s/p tPA     Hospital Course/ Overnight Events:  57year old male presented to [facility] with strokelike symptoms. R sided facial droop, slurred speech, R leg weakness. Negative for blood thinners. Hx: CABG several years ago. LKW 1858. Stroke alert called 1922, NIH=4, Glucose- 135, /89. tPA given 2025. CT head no acute ICH or mass. CT perfusion- 38 cc of parenchyma L superior posterior frontal lobe with mild ischemia. 2/15 Transferred to [facility]. E's main for further management. Patient with no complaints, walked to bed at 1000. New lab work pending. CT pending. MRI pending. [facility] physician is [facility] Benson. PHYSICAL EXAM:    BP (!) 143/80   Pulse 58   Temp 98.3 °F (36.8 °C) (Oral)   Resp 15   SpO2 98%   No intake or output data in the 24 hours ending 02/15/22 1032     General appearance:  Comfortable. Pain Description: none    GCS:  15  4 - Opens eyes on own   6 - Follows simple motor commands  5 - Alert and oriented x 4     Pupil size: Left 3 mm  Right 3 mm  Pupil reaction: Yes  Wiggles fingers: Left Yes Right Yes  Hand grasp:   Left normal     Right normal  Wiggles toes: Left Yes    Right Yes  Plantar flexion: Left normal    Right normal    CONSTITUTIONAL: No acute distress, lying in hospital bed. NEUROLOGIC: PERRL, oriented x 4  CARDIOVASCULAR: S1 S2, regular rate, regular rhythm, no murmur/gallop/rub. Monitor: SR/SB  PULMONARY: Bilaterally clear, diminished. No rhonchi/rales/wheezes, no use of accessory muscles. RENAL: Voids. ABDOMEN: soft, nontender, nondistended, nontympanic, normal bowel sounds. BM 2/15  MUSCULOSKELETAL: moves all extremities purposefully, 5/5 strength. Chronic pain hips, back, shoulders- retired .    SKIN/EXTREMITIES: no rashes/ecchymosis, no edema/clubbing, warm/dry, good capillary refill     LINES: PIV    CONSULTS: Medicine  Neurology    Past Medical History:   Diagnosis Date    Aortic stenosis 2/18/2013    Asthma     Bradycardia 2/18/2013    Hx of vasectomy     Hyperlipidemia     Hypothyroidism     Sleep apnea     Thyroid disease        ASSESSMENT/PLAN:       · Neuro: Monitor neuro status,  Neurology following, MRI pending. CT scan pending  · CV: No acute issues. Hx Aortic stenosis, Sinus bradycardia with 1st degree AV block , Aortic valve replacement (pig) 2018. HLD. Monitor hemodynamics. BP goal less than 180 mm Hg. PRN hydralazine. Statin. ECHO pending. Lipid panel pending. Metoprolol   · Pulm: No acute issues. Hx: Asthma, Sleep apnea, previous smoker. CPAP at night. Monitor RR & SpO2. Flonase   · GI: No acute issues. Passed swallow. Diet. Monitor bowel function. Speech . Tigan PRN   · Renal: No acute issues. Hx: Vasectomy. Monitor BUN & Cr. Monitor electrolytes & replace as needed. Monitor urine output. Voids    · ID: No acute issues. Monitor WBC  · Endocrine: No acute issues. Hx: hypothyroid. Levothyroxine. A1C pending   · MSK: No acute issues. ROM. turn & reposition. PT/OT . Cane- R side , long distance. · Heme: No acute issues. Monitor CBC. Bowel regimen: Glycolax, Senna    Pain control/Sedation: Tylenol  DVT prophylaxis: SCD  GI prophylaxis: Diet  Ancillary consults:  Neurology, Medicine and Critical Care  Patient/Family update: Patient's wife at the bedside. Questions answered. Support given. Will update when available.     Code status:  Full    Disposition: Admitted to Neuro ICU    Electronically signed by JOHNATHAN Clveeland CNP on 2/15/2022 at 10:32 AM

## 2022-02-15 NOTE — CARE COORDINATION
2/15/2022 - Care Coordination - admitted for acute ischemic stroke. Telestroke consulted and tPA given and then transferred from [facility] for neuology consult. Neurology following. Echo completed. Spoke with pt in room to discuss discharge planning. Pt is a , states he is 100% service connected. He states he served 20 years in the [employer]. He is retired. PCP is Dr Bennie Briceño at the [facility] clinic. Also follow with Dr Shani Bonilla in [facility]. He gets his medications through the [place] and also uses [facility] [facility] in [facility]. Lives with his wife in a ranch style home with 3 steps to enter. Independent in ADLs and drives. Denies hx HHC, AIDE/ARu and DME. His plan is to discharge home when medically stable. His wife will provide transportation at discharge. [address] and left voicemail message for transfer center of pt admission here. SW/CM will follow.

## 2022-02-15 NOTE — VIRTUAL HEALTH
Consults  Patient Location:  [id number] [facility] Emergency Department    Provider Location (City/State): [facility] Stroke and Vascular Neurology Consult for  [address] Stroke Alert through [address] @ 07:25 pm  2/15/2022 3:15 PM  Pt Name: Familia العراقي  MRN: [id number]  [place]: 1960  Date of evaluation: 2/15/2022  Primary Care Physician: Emily Michael MD  Reason for Evaluation: Stroke evaluation with Phone Consult, Discussion and Review of imaging    Familia العراقي is a 57 y.o. male who presents with acute onset right facial droop speech abnormality. Patient was found to have aphasia, right facial asymmetry. LKW: 18:58  NIH:  4    Allergies  has No Known Allergies. Medications  Prior to Admission medications    Medication Sig Start Date End Date Taking? Authorizing Provider   PROVENTIL  (76 Base) MCG/ACT inhaler  8/2/18  Yes Historical Provider, MD   metoprolol tartrate (LOPRESSOR) 25 MG tablet 25 mg 2 times daily  9/12/18  Yes Historical Provider, MD   Multiple Vitamin (MULTI-VITAMIN DAILY PO) Take by mouth daily   Yes Historical Provider, MD   aspirin 81 MG tablet Take 81 mg by mouth daily   Yes Historical Provider, MD   CPAP Machine MISC by Does not apply route   Yes Historical Provider, MD   levothyroxine (SYNTHROID) 137 MCG tablet Take 150 mcg by mouth Daily    Yes Historical Provider, MD   FLUTICASONE FUROATE NA 2 sprays by Nasal route daily. Yes Historical Provider, MD   rosuvastatin (CRESTOR) 10 MG tablet Take 10 mg by mouth daily. Yes Historical Provider, MD   Cholecalciferol (VITAMIN D) 2000 UNITS CAPS capsule Take 4,000 capsules by mouth daily    Yes Historical Provider, MD   albuterol (PROVENTIL) (2.5 MG/3ML) 0.083% nebulizer solution Take 2.5 mg by nebulization every 6 hours as needed.      Yes Historical Provider, MD    Scheduled Meds:  Continuous Infusions:  PRN Meds:.  Past Medical History   has a past medical history of Aortic stenosis, Asthma, Bradycardia, Hx of vasectomy, Hyperlipidemia, Hypothyroidism, Sleep apnea, and Thyroid disease. Social History  Social History     Socioeconomic History    Marital status:      Spouse name: Not on file    Number of children: Not on file    Years of education: Not on file    Highest education level: Not on file   Occupational History    Occupation:     Tobacco Use    Smoking status: Former Smoker     Packs/day: 0.50     Years: 15.00     Pack years: 7.50     Types: Cigarettes     Quit date: 2008     Years since quittin.1    Smokeless tobacco: Never Used   Vaping Use    Vaping Use: Never used   Substance and Sexual Activity    Alcohol use: No    Drug use: No    Sexual activity: Not on file   Other Topics Concern    Not on file   Social History Narrative    Not on file     Social Determinants of Health     Financial Resource Strain:     Difficulty of Paying Living Expenses: Not on file   Food Insecurity:     Worried About [address] in the Last Year: Not on file    [address] in the Last Year: Not on file   Transportation Needs:     Lack of Transportation (Medical): Not on file    Lack of Transportation (Non-Medical):  Not on file   Physical Activity:     Days of Exercise per Week: Not on file    Minutes of Exercise per Session: Not on file   Stress:     Feeling of Stress : Not on file   Social Connections:     Frequency of Communication with Friends and Family: Not on file    Frequency of Social Gatherings with Friends and Family: Not on file    Attends Orthodoxy Services: Not on file    Active Member of Clubs or Organizations: Not on file    Attends Club or Organization Meetings: Not on file    Marital Status: Not on file   Intimate Partner Violence:     Fear of Current or Ex-Partner: Not on file    Emotionally Abused: Not on file    Physically Abused: Not on file   [place] Sexually Abused: Not on file   Housing Stability:     Unable to Pay for Housing in the Last Year: Not on file    Number of [place] in the Last Year: Not on file    Unstable Housing in the Last Year: Not on file     Family History      Problem Relation Age of Onset    Heart Disease Mother     Emphysema Father        OBJECTIVE  BP (!) 143/80   Pulse 52   Temp 97.6 °F (36.4 °C) (Oral)   Resp 16   Ht 5' 8\" (1.727 m)   Wt 286 lb (129.7 kg)   SpO2 99%   BMI 43.49 kg/m²     NIH Stroke Scale  Interval: Reassessment  Level of Consciousness (1a. ): Alert  LOC Questions (1b. ): Answers both correctly  LOC Commands (1c. ): Performs both tasks correctly  Best Gaze (2. ): Normal  Visual (3. ): No visual loss  Facial Palsy (4. ): Normal symmetrical movement  Motor Arm, Left (5a. ): No drift  Motor Arm, Right (5b. ): No drift  Motor Leg, Left (6a. ): No drift  Motor Leg, Right (6b. ): No drift  Limb Ataxia (7. ): Absent  Sensory (8. ): Normal  Best Language (9. ): No aphasia  Dysarthria (10. ): Normal  Extinction and Inattention (11): No abnormality  Total: 0  Pre-Morbid mRS: 0    Imaging:  Images were personally reviewed with VIZ. AI and PACS used to review images including:  CT brain without contrast: No evidence of acute intracranial hemorrhage   CTA imaging: No LVO, No evidence of high grade stenosis     Assessment  58 y/o M with acute onset right facial droop and aphasia  NIHSS was 04   Patient was within tPA window   CT head no e/o hemorrhage   CTA head no LVO      Recommendations:  Patient is an IV-tPA candidate:    No history of recent bleeding, no history of brain bleed/aneurysm/brain tumor, or recent surgery/trauma. IV-tPA Consent: I have informed the patient and/or family of all the associated risks including 6% of sich/death and 1-3% of angioedema, benefits, and alternatives to IV t-PA. The patient and/or family voluntarily consent to the administration of IV t-PA.     --Admit the patient to an intensive care or stroke unit for monitoring. --If the patient develops severe headache, acute hypertension, nausea, or vomiting or has a worsening neurological examination, discontinue the infusion (if IV rtPA is being administered) and obtain emergent CT scan. --Measure blood pressure and perform neurological assessments every 15 minutes during and after IV rtPA infusion for 2 hours, then every 30 minutes for 6 hours, then hourly until 24 hours after IV rtPA treatment. --Increase the frequency of blood pressure measurements if systolic blood pressure is >180 mmHg or if diastolic blood pressure is >105 mmHg; administer antihypertensive medications to maintain blood pressure at or below these levels  --Delay placement of nasogastric tubes, indwelling bladder catheters, or intra-arterial pressure catheters if the patient can be safely managed without them. --Obtain a follow-up CT or MRI scan at 24 hours after IV rtPA before starting anticoagulants or antiplatelet agents. No antiplatelet agent or SQ heparinoids for 24 hrs. --Keep HOB < 15 degrees  --Keep NPO unless passes bedside dysphagia screen or swallow evaluation.   --Obtain MRI brain w/o contrast, MRA or CTA of head and neck (carotid u/s if MRA and CTA not available), transthoracic echo-to r/o structural heart disease that can lead to source of emboli, fasting lipid panel, HgbA1c.   --Smoking cessation education and nicotine patch if indicated  --IVF with NS @ 100cc per hour  --PT/OT/SLP    - If stroke, initiate antiplatelet therapy (ASA 325mg po qday or Plavix 75mg po qday) after 24 hrs s/p TPA and imaging confirming no hemorrhagic transformation  - If stroke/TIA due to large vessel severe INTRACRANIAL stenosis: Consider  mg + Plavix 75 mg x 90 days, then ASA 81 mg or Plavix ALONE; administer after 24 hrs s/p TPA and imaging confirming no hemorrhagic transformation  - If stroke/TIA due to atrial fibrillation,  then in general: antiplatelet bridge for 6-39 days, then (re) anticoagulate (timing depending on size of stroke, blood on f/u imaging, TTE)  - If stroke/TIA and no clear etiology is found, consider outpatient 30 day event monitor to r/o PAFIB, especially if patient has CV risk factors  - Initiation of statin therapy is recommended to reduce risk of stroke and CV events, in patients with stroke/tia presumed to be of atherosclerotic origin and a LDL level >100mg/dl w/ or w/o evidence of CV disease. - Initiation of blood pressure therapy is indicated for previously untreated patients with stroke/tia who after the first several days have an established BP >683NNYX systolic or >59OBHG diastolic. Recommend HCTZ +/- ACE-I/ARB for greater stroke prevention          Discussed with ED Physician    At least 24 min of Telemedicine and time in conversation directly with ED staff and physician for the patient who is in imminent and life threatening deterioration without further treatment and evaluation. This Virtual Visit was conducted with patient's (and/or legal guardian's) consent, to provide telestroke consultation and necessary medical care. Time spent examining patient, reviewing the images personally, reviewing the chart, perform high complexity decision making and speaking with the nursing staff regarding recommendations    This is a Phone Consult, I have not seen the patient face to face, the telemedicine device was not utilized. Osmin Peterson MD,   Stroke, Neurocritical Care And/or [address] [place] [address] Stroke Network  [id number]  Electronically signed 2/15/2022 at 3:15 PM      This virtual visit was conducted via interactive/real-time audio/video.

## 2022-02-15 NOTE — PROGRESS NOTES
2/15- 11:45am- [facility] or possible Mobile- Per RN patient is 26\" across and he stated he had to be done at an Open last time he was here. RN to see if it can wait until Thursday for Mobile(pt. can go in [facility]) or if needing done sooner sent to [facility]. RN will let us know. MRI screening will need completed also.

## 2022-02-15 NOTE — FLOWSHEET NOTE
Patient very eager to eat. Bedside swallow test performed, no coughing, choking, or difficulty swallowing noted. Passed at this time.

## 2022-02-15 NOTE — PROGRESS NOTES
Familia العراقي is a 57 y.o. right handed man    Past Medical History:     Past Medical History:   Diagnosis Date    Aortic stenosis 2013    Asthma     Bradycardia 2013    Hx of vasectomy     Hyperlipidemia     Hypothyroidism     Sleep apnea     Thyroid disease      Past Surgical History:     Past Surgical History:   Procedure Laterality Date    VASECTOMY       Allergies:     Patient has no known allergies. Medications:     Prior to Admission medications    Medication Sig Start Date End Date Taking? Authorizing Provider   PROVENTIL  (82 Base) MCG/ACT inhaler  18   Historical Provider, MD   metoprolol tartrate (LOPRESSOR) 25 MG tablet 25 mg 2 times daily  18   Historical Provider, MD   Multiple Vitamin (MULTI-VITAMIN DAILY PO) Take by mouth daily    Historical Provider, MD   aspirin 81 MG tablet Take 81 mg by mouth daily    Historical Provider, MD   CPAP Machine MISC by Does not apply route    Historical Provider, MD   levothyroxine (SYNTHROID) 137 MCG tablet Take 150 mcg by mouth Daily     Historical Provider, MD   FLUTICASONE FUROATE NA 2 sprays by Nasal route daily. Historical Provider, MD   rosuvastatin (CRESTOR) 10 MG tablet Take 10 mg by mouth daily. Historical Provider, MD   Cholecalciferol (VITAMIN D) 2000 UNITS CAPS capsule Take 4,000 capsules by mouth daily     Historical Provider, MD   albuterol (PROVENTIL) (2.5 MG/3ML) 0.083% nebulizer solution Take 2.5 mg by nebulization every 6 hours as needed.       Historical Provider, MD     Social History:     Social History     Tobacco Use    Smoking status: Former Smoker     Packs/day: 0.50     Years: 15.00     Pack years: 7.50     Types: Cigarettes     Quit date: 2008     Years since quittin.1    Smokeless tobacco: Never Used   Vaping Use    Vaping Use: Never used   Substance Use Topics    Alcohol use: No    Drug use: No       Review of Systems:     No chest pain or palpitations  No SOB  No vertigo, lightheadedness or loss of consciousness  No falls, tripping or stumbling  No incontinence of bowels or bladder  No itching or bruising appreciated  ROS otherwise negative     Family History:     Family History   Problem Relation Age of Onset    Heart Disease Mother     Emphysema Father         History of Present Illness:     Patient was at home with no complaints on 2/14/2022 when suddenly around 650 in the afternoon he got up out of his chair noticed that he had trouble swallowing his speech was slurred and he displayed a right facial droop. His wife had difficulty understanding him and said immediately you are going to the hospital.    While he was in route to the hospital his symptoms improved however upon arrival to the hospital he states that his face was drooping and speech was slurred once again. He was identified to be a TPA candidate and was given IV TPA CTA showed no large vessel occlusion therefore he was not an IR candidate. He does have a long cardiac history history of valvular replacement as well as ACB-he does report a history of bradycardia but denies any A. fib or irregularities.       Objective:   /77   Pulse 58   Temp 98.2 °F (36.8 °C) (Oral)   Resp 30   SpO2 99%      General appearance: alert, appears stated age and cooperative  Head: Normocephalic, without obvious abnormality, atraumatic  Extremities: no cyanosis or edema  Pulses: 2+ and symmetric  Skin: no rashes or lesions     Mental Status: Alert, oriented to person place and year     Speech: clear  Language: appropriate     Cranial Nerves:  I: smell    II: visual acuity     II: visual fields Full    II: pupils HADLEY   III,VII: ptosis None   III,IV,VI: extraocular muscles  EOMI without nystagmus    V: mastication Normal   V: facial light touch sensation  Normal   V,VII: corneal reflex  Present   VII: facial muscle function - upper     VII: facial muscle function - lower Normal   VIII: hearing Normal   IX: soft palate evidence of T-max > 6 seconds.  No  perfusion evidence of infarct core.     CTA Neck:  No hemodynamically significant stenosis or dissection of the cervical  internal carotid arteries. No high-grade stenosis or dissection of the cervical vertebral arteries.     CTA Head:  No evidence of proximal large vessel arterial occlusion or high-grade  stenosis. I independently reviewed the labs and imaging studies today. Assessment:     Patient with sudden onset of facial droop, slurred speech and difficulty swallowing most likely related to an ischemic infarct.   We are unable to get an MRI of his brain however I am concerned with embolic origin given his cardiac history is as well as the improvement post IV TPA   Thankfully current, he is currently a NIHSS of 0    History of ACB and aortic valve replacement    She of DION    Hypertension hyperlipidemia    Plan:     Patient will start DAPT-aspirin 81 mg and Plavix 75 mg    Continue Crestor goal LDL less than 70    PT/OT/SLP    Echo with bubble to be completed    Most likely will need cardiac event monitoring on discharge-this was ordered today    JOHNATHAN Nielsen - CNS  2:15 PM  2/15/2022

## 2022-02-15 NOTE — ED NOTES
Radiology Procedure Waiver   Name: Driss Cody  : 1960  MRN: [id number]    Date:  22    Time: 7:18 PM EST    Benefits of immediately proceeding with Radiology exam(s) without pre-testing outweigh the risks or are not indicated as specified below and therefore the following is/are being waived:    [] Pregnancy test   [] Patients LMP on-time and regular.   [] Patient had Tubal Ligation or has other Contraception Device. [] Patient  is Menopausal or Premenarcheal.    [] Patient had Full or Partial Hysterectomy. [] Protocol for Iodine allergy    [] MRI Questionnaire     [x] BUN/Creatinine   [] Patient age w/no hx of renal dysfunction. [] Patient on Dialysis. [] Recent Normal Labs.   Electronically signed by Shasha Haynes DO on 22 at 7:18 PM EST          Stroke Team     Shasha Haynes DO  22

## 2022-02-15 NOTE — PROGRESS NOTES
[facility] called with an bed assignment [facility] ETA [id number]

## 2022-02-16 VITALS
SYSTOLIC BLOOD PRESSURE: 121 MMHG | WEIGHT: 286 LBS | TEMPERATURE: 98.1 F | DIASTOLIC BLOOD PRESSURE: 87 MMHG | HEART RATE: 69 BPM | BODY MASS INDEX: 43.35 KG/M2 | HEIGHT: 68 IN | OXYGEN SATURATION: 98 % | RESPIRATION RATE: 16 BRPM

## 2022-02-16 LAB
ALBUMIN SERPL-MCNC: 4.2 G/DL (ref 3.5–5.2)
ALP BLD-CCNC: 66 U/L (ref 40–129)
ALT SERPL-CCNC: 17 U/L (ref 0–40)
ANION GAP SERPL CALCULATED.3IONS-SCNC: 7 MMOL/L (ref 7–16)
AST SERPL-CCNC: 16 U/L (ref 0–39)
BASOPHILS ABSOLUTE: 0.04 E9/L (ref 0–0.2)
BASOPHILS RELATIVE PERCENT: 0.5 % (ref 0–2)
BILIRUB SERPL-MCNC: 0.3 MG/DL (ref 0–1.2)
BUN BLDV-MCNC: 17 MG/DL (ref 6–23)
CALCIUM SERPL-MCNC: 9.3 MG/DL (ref 8.6–10.2)
CHLORIDE BLD-SCNC: 103 MMOL/L (ref 98–107)
CO2: 30 MMOL/L (ref 22–29)
CREAT SERPL-MCNC: 1.1 MG/DL (ref 0.7–1.2)
EOSINOPHILS ABSOLUTE: 0.14 E9/L (ref 0.05–0.5)
EOSINOPHILS RELATIVE PERCENT: 1.8 % (ref 0–6)
GFR AFRICAN AMERICAN: >60
GFR NON-AFRICAN AMERICAN: >60 ML/MIN/1.73
GLUCOSE BLD-MCNC: 102 MG/DL (ref 74–99)
HCT VFR BLD CALC: 43.1 % (ref 37–54)
HEMOGLOBIN: 14.4 G/DL (ref 12.5–16.5)
IMMATURE GRANULOCYTES #: 0.02 E9/L
IMMATURE GRANULOCYTES %: 0.3 % (ref 0–5)
LV EF: 60 %
LVEF MODALITY: NORMAL
LYMPHOCYTES ABSOLUTE: 2.12 E9/L (ref 1.5–4)
LYMPHOCYTES RELATIVE PERCENT: 26.7 % (ref 20–42)
MAGNESIUM: 2 MG/DL (ref 1.6–2.6)
MCH RBC QN AUTO: 33.8 PG (ref 26–35)
MCHC RBC AUTO-ENTMCNC: 33.4 % (ref 32–34.5)
MCV RBC AUTO: 101.2 FL (ref 80–99.9)
MONOCYTES ABSOLUTE: 0.72 E9/L (ref 0.1–0.95)
MONOCYTES RELATIVE PERCENT: 9.1 % (ref 2–12)
NEUTROPHILS ABSOLUTE: 4.91 E9/L (ref 1.8–7.3)
NEUTROPHILS RELATIVE PERCENT: 61.6 % (ref 43–80)
PDW BLD-RTO: 13.1 FL (ref 11.5–15)
PHOSPHORUS: 4.3 MG/DL (ref 2.5–4.5)
PLATELET # BLD: 164 E9/L (ref 130–450)
PMV BLD AUTO: 9.9 FL (ref 7–12)
POTASSIUM SERPL-SCNC: 4 MMOL/L (ref 3.5–5)
RBC # BLD: 4.26 E12/L (ref 3.8–5.8)
SODIUM BLD-SCNC: 140 MMOL/L (ref 132–146)
TOTAL PROTEIN: 6.9 G/DL (ref 6.4–8.3)
WBC # BLD: 8 E9/L (ref 4.5–11.5)

## 2022-02-16 PROCEDURE — 83735 ASSAY OF MAGNESIUM: CPT

## 2022-02-16 PROCEDURE — 6370000000 HC RX 637 (ALT 250 FOR IP): Performed by: PHYSICIAN ASSISTANT

## 2022-02-16 PROCEDURE — 6370000000 HC RX 637 (ALT 250 FOR IP): Performed by: NURSE PRACTITIONER

## 2022-02-16 PROCEDURE — 6370000000 HC RX 637 (ALT 250 FOR IP): Performed by: INTERNAL MEDICINE

## 2022-02-16 PROCEDURE — 85025 COMPLETE CBC W/AUTO DIFF WBC: CPT

## 2022-02-16 PROCEDURE — 97165 OT EVAL LOW COMPLEX 30 MIN: CPT

## 2022-02-16 PROCEDURE — 93306 TTE W/DOPPLER COMPLETE: CPT

## 2022-02-16 PROCEDURE — 3E03317 INTRODUCTION OF OTHER THROMBOLYTIC INTO PERIPHERAL VEIN, PERCUTANEOUS APPROACH: ICD-10-PCS | Performed by: SPECIALIST

## 2022-02-16 PROCEDURE — 6370000000 HC RX 637 (ALT 250 FOR IP): Performed by: CLINICAL NURSE SPECIALIST

## 2022-02-16 PROCEDURE — 92610 EVALUATE SWALLOWING FUNCTION: CPT | Performed by: SPEECH-LANGUAGE PATHOLOGIST

## 2022-02-16 PROCEDURE — 97161 PT EVAL LOW COMPLEX 20 MIN: CPT

## 2022-02-16 PROCEDURE — APPSS30 APP SPLIT SHARED TIME 16-30 MINUTES: Performed by: NURSE PRACTITIONER

## 2022-02-16 PROCEDURE — 99233 SBSQ HOSP IP/OBS HIGH 50: CPT | Performed by: NURSE PRACTITIONER

## 2022-02-16 PROCEDURE — 80053 COMPREHEN METABOLIC PANEL: CPT

## 2022-02-16 PROCEDURE — 84100 ASSAY OF PHOSPHORUS: CPT

## 2022-02-16 PROCEDURE — 92523 SPEECH SOUND LANG COMPREHEN: CPT | Performed by: SPEECH-LANGUAGE PATHOLOGIST

## 2022-02-16 PROCEDURE — 93246 EXT ECG>7D<15D RECORDING: CPT

## 2022-02-16 RX ORDER — LISINOPRIL 5 MG/1
5 TABLET ORAL DAILY
Status: DISCONTINUED | OUTPATIENT
Start: 2022-02-16 | End: 2022-02-16 | Stop reason: HOSPADM

## 2022-02-16 RX ORDER — ASPIRIN 81 MG/1
81 TABLET ORAL DAILY
Qty: 30 TABLET | Refills: 3 | Status: SHIPPED | OUTPATIENT
Start: 2022-02-17

## 2022-02-16 RX ORDER — LISINOPRIL 5 MG/1
5 TABLET ORAL DAILY
Qty: 30 TABLET | Refills: 3 | Status: SHIPPED | OUTPATIENT
Start: 2022-02-17

## 2022-02-16 RX ORDER — ASPIRIN 81 MG/1
81 TABLET ORAL DAILY
Qty: 30 TABLET | Refills: 3 | Status: SHIPPED | OUTPATIENT
Start: 2022-02-17 | End: 2022-02-16

## 2022-02-16 RX ORDER — CLOPIDOGREL BISULFATE 75 MG/1
75 TABLET ORAL DAILY
Qty: 30 TABLET | Refills: 3 | Status: SHIPPED | OUTPATIENT
Start: 2022-02-17

## 2022-02-16 RX ORDER — CLOPIDOGREL BISULFATE 75 MG/1
75 TABLET ORAL DAILY
Qty: 30 TABLET | Refills: 3 | Status: SHIPPED | OUTPATIENT
Start: 2022-02-17 | End: 2022-02-16

## 2022-02-16 RX ORDER — LISINOPRIL 5 MG/1
5 TABLET ORAL DAILY
Qty: 30 TABLET | Refills: 3 | Status: SHIPPED | OUTPATIENT
Start: 2022-02-17 | End: 2022-02-16

## 2022-02-16 RX ADMIN — LEVOTHYROXINE SODIUM 150 MCG: 0.07 TABLET ORAL at 06:36

## 2022-02-16 RX ADMIN — LISINOPRIL 5 MG: 5 TABLET ORAL at 12:05

## 2022-02-16 RX ADMIN — ASPIRIN 81 MG: 81 TABLET, COATED ORAL at 08:44

## 2022-02-16 RX ADMIN — ROSUVASTATIN CALCIUM 40 MG: 20 TABLET, FILM COATED ORAL at 08:44

## 2022-02-16 RX ADMIN — CLOPIDOGREL BISULFATE 75 MG: 75 TABLET ORAL at 08:44

## 2022-02-16 ASSESSMENT — PAIN SCALES - GENERAL
PAINLEVEL_OUTOF10: 0

## 2022-02-16 NOTE — PROGRESS NOTES
need an echocardiogram prior to discharge given his cardiac history and would also recommend outpatient telemetry monitoring for A. Fib-echocardiogram complete, no acute findings      DVT Prophylaxis   PT/OT  Discharge planning-okay for discharge, follow-up with PCP in 1 week          Khalif Castañeda MD  10:02 AM  2/16/2022

## 2022-02-16 NOTE — DISCHARGE SUMMARY
Physician Discharge Summary     Patient ID:  Ragini Harvey  [id number]  38 y.o.  1960    Admit date: 2/15/2022    Discharge date and time: 2/16/2022    Admission Diagnoses:   Patient Active Problem List   Diagnosis    Aortic stenosis    Bradycardia    Acute ischemic stroke [facility])       Discharge Diagnoses: Acute stroke    Consults: Interventional radiology, neurology, neuro intensive care    Procedures: [facility] SPGS Course: Admitted to neuro intensive care for observation status post IV TPA for acute stroke, without large vessel involvement.   Patient with known history of coronary artery disease status post CABG remotely along with hypertension hyperlipidemia and morbid obesity     Being monitored in ICU post IV TPA  Resume antiplatelet agent if okay with neurology  High intensity statin-rosuvastatin 40 mg  Check hemoglobin A1c/6 lipid panel/completely normal  Monitor blood pressure closely-decrease beta-blocker due to bradycardia, add lisinopril five daily  Bradycardia noted-decrease doses of beta-blocker  Risk factor modifications discussed with patient and wife including weight loss  Neurology evaluation pending on my assessment of patient  He will need an echocardiogram prior to discharge given his cardiac history and would also recommend outpatient telemetry monitoring for A. Fib-echocardiogram complete, no acute findings    Okay for discharge from medicine standpoint  All medications have been given to him via paper scripts          Recent Labs     02/14/22  1934 02/15/22  1110 02/16/22  0447   WBC 7.6 7.6 8.0   HGB 14.6 13.8 14.4   HCT 44.8 43.3 43.1    147 164       Recent Labs     02/14/22  2002 02/15/22  1110 02/16/22  0447    143 140   K 4.3 3.9 4.0    104 103   CO2 26 25 30*   BUN 20 16 17   CREATININE 1.1 1.1 1.1   CALCIUM 9.1 9.0 9.3       Echo Complete    Result Date: 2/16/2022  Transthoracic Echocardiography Report (TTE)  Demographics   Patient Name        Loulou Gender               Male   Medical Record      [id number]      Room Number          2411  Number   Account #           [de-identified]     Procedure Date       02/16/2022   Corporate ID                      Ordering Physician   Jluis Abarca MD   Accession Number    [id number]    Referring Physician   Date of Birth       1960    Sonographer          Dianelys Daly [facility]   Age                 57 year(s)    Interpreting         [address]                                    Physician            Devin Manzano MD                                     Any Other  Procedure Type of Study   TTE procedure:Echo Complete W/Doppler & Color Flow. Procedure Date Date: 02/16/2022 Start: 11:23 AM Study Location: Portable Technical Quality: Limited visualization due to body habitus. Indications:CVA. Patient Status: Routine Contrast Medium: Definity and Bubble Study. Height: 68 inches Weight: 286 pounds BSA: 2.38 m^2 BMI: 43.49 kg/m^2 BP: 136/89 mmHg  Findings   Left Ventricle  Normal left ventricle size. Mild left ventricular concentric hypertrophy noted. Normal LV segmental wall motion. Normal ejection fraction. Ejection fraction is visually estimated at 60%. There is doppler evidence of stage II diastolic dysfunction. Right Ventricle  Normal right ventricular size and function. Left Atrium  The left atrium is moderately dilated. Agitated saline injected for shunt evaluation. Interatrial septum intact. Right Atrium  Mildly enlarged right atrium size. Mitral Valve  Calcification of the mitral valve noted. No evidence of mitral valve stenosis. Mild to moderate mitral regurgitation is present. Tricuspid Valve  The tricuspid valve appears structurally normal.  Physiologic and/or trace tricuspid regurgitation. Pulmonary hypertension is not present. Aortic Valve  Individual aortic valve leaflets are not clearly visualized. The aortic valve appears moderately sclerotic.   No hemodynamically significant aortic stenosis is present. No evidence of aortic valve regurgitation. Pulmonic Valve  The pulmonic valve was not well visualized. No evidence of pulmonic valve stenosis. No evidence of any pulmonic regurgitation. Pericardial Effusion  No evidence of pericardial effusion. Pleural Effusion  No evidence of pleural effusion. Aorta  Aortic root dimension within normal limits. Conclusions   Signature   ----------------------------------------------------------------  Electronically signed by Lizzie Mcgovern MD(Interpreting  physician) on 02/16/2022 01:51 PM  ----------------------------------------------------------------  M-Mode/2D Measurements & Calculations    LV Volume Diastolic: 712 ml                 LA Dimension: 3.7 cmAO Root   LV Volume Systolic: 14.6 ml                 Dimension: 4.1 cm   LV EDV/LV EDV Index: 156 ml/66 ml/m^2LV   ESV/LV ESV Index: 67.4 ml/28ml/ m^2   EF Calculated: 56.8 %    LV Length: 9 cm    LVOT: 2.6 cm                                LA volume/Index: 74.7 ml                                               /31.4ml/m^2                                               RA Area: 22 cm^2  Doppler Measurements & Calculations   MV Peak E-Wave:    AV Peak Velocity: 1.61 m/s      LVOT Peak Velocity:  0.9 m/s            AV Peak Gradient: 10.41 mmHg    1.01 m/s  MV Peak A-Wave:    AV Mean Velocity: 1.09 m/s      LVOT Mean Velocity:  0.83 m/s           AV Mean Gradient: 5.4 mmHg      0.64 m/s  MV E/A Ratio: 1.08 AV VTI: 35.7 cm                 LVOT Peak Gradient: 4  MV Peak Gradient:  AV Area (Continuity):2.94 cm^2  mmHgLVOT Mean Gradient:  4.8 mmHg                                           1.9 mmHg  MV Mean Gradient:  LVOT VTI: 19.8 cm  1.9 mmHg           IVRT: 27.7 msec  MV Mean Velocity:  0.62 m/s           Pulm. Vein A Reversal  MV Deceleration    Duration:133.8 msec  Time: 223 msec     Pulm. Vein D Velocity:0.5  MV P1/2t: 118.2    m/sPulm.  Vein A Reversal  msec               Velocity:0.26 m/s  MVA by PHT:1.86 Pulm. Vein S Velocity: 0.68 m/s  cm^2  MV Area  (continuity): 2.8  cm^2  MV E' Septal  Velocity: 0.07 m/s  MV E' Lateral  Velocity: 6 m/s  MR Velocity: 5.25  m/s  MR VTI: 185 cm  [id number]    CT head without contrast    Result Date: 2/15/2022  EXAMINATION: CT OF THE HEAD WITHOUT CONTRAST  2/15/2022 6:04 pm TECHNIQUE: CT of the head was performed without the administration of intravenous contrast. Dose modulation, iterative reconstruction, and/or weight based adjustment of the mA/kV was utilized to reduce the radiation dose to as low as reasonably achievable. COMPARISON: CT perfusion 02/14/2022 HISTORY: ORDERING SYSTEM PROVIDED HISTORY: 24 hours post tPA TECHNOLOGIST PROVIDED HISTORY: Obtain 24 hours after administration of tPA Has a \"code stroke\" or \"stroke alert\" been called? ->No Reason for exam:->24 hours post tPA What reading provider will be dictating this exam?->CRC FINDINGS: Parenchyma: No evidence of acute intracranial hemorrhage or mass effect. Ventricles: No evidence of hydrocephalus. Calvarium: No acute calvarial fracture is seen. No acute intracranial hemorrhage or mass effect. Discharge Exam:    HEENT: NCAT,  PERRLA, No JVD  Heart:  RRR, no murmurs, gallops, or rubs.   Lungs:  CTA bilaterally, no wheeze, rales or rhonchi  Abd: bowel sounds present, nontender, nondistended, no masses  Extrem:  No clubbing, cyanosis, or edema    Disposition: home     Patient Condition at Discharge: Stable    Patient Instructions:      Medication List      START taking these medications    aspirin 81 MG EC tablet  Take 1 tablet by mouth daily  Start taking on: February 17, 2022  Replaces: aspirin 81 MG tablet     clopidogrel 75 MG tablet  Commonly known as: PLAVIX  Take 1 tablet by mouth daily  Start taking on: February 17, 2022     lisinopril 5 MG tablet  Commonly known as:

## 2022-02-16 NOTE — PROGRESS NOTES
appropriate     Cranial Nerves:  I: smell    II: visual acuity     II: visual fields Full    II: pupils HADLEY   III,VII: ptosis None   III,IV,VI: extraocular muscles  EOMI without nystagmus    V: mastication Normal   V: facial light touch sensation  Normal   V,VII: corneal reflex  Present   VII: facial muscle function - upper     VII: facial muscle function - lower Normal   VIII: hearing Normal   IX: soft palate elevation  Normal   IX,X: gag reflex    XI: trapezius strength  5/5   XI: sternocleidomastoid strength 5/5   XI: neck extension strength  5/5   XII: tongue strength  Normal     Motor:  5/5 throughout  Normal bulk and tone   No drift    Sensory:  LT and PP normal  Vibration normal     Coordination:   FN, FFM and UMA normal    DTR:   Right Brachioradialis reflex 0  Left Brachioradialis reflex 0  Right Biceps reflex 1+  Left Biceps reflex 1+  Right Quadriceps reflex 0  Left Quadriceps reflex 0  Right Achilles reflex 0  Left Achilles reflex 0    No Babinski  No Vegas's     Laboratory/Radiology:     CBC with Differential:    Lab Results   Component Value Date    WBC 8.0 02/16/2022    RBC 4.26 02/16/2022    HGB 14.4 02/16/2022    HCT 43.1 02/16/2022     02/16/2022    .2 02/16/2022    MCH 33.8 02/16/2022    MCHC 33.4 02/16/2022    RDW 13.1 02/16/2022    LYMPHOPCT 26.7 02/16/2022    MONOPCT 9.1 02/16/2022    BASOPCT 0.5 02/16/2022    MONOSABS 0.72 02/16/2022    LYMPHSABS 2.12 02/16/2022    EOSABS 0.14 02/16/2022    BASOSABS 0.04 02/16/2022     CMP:    Lab Results   Component Value Date     02/16/2022    K 4.0 02/16/2022     02/16/2022    CO2 30 02/16/2022    BUN 17 02/16/2022    CREATININE 1.1 02/16/2022    GFRAA >60 02/16/2022    LABGLOM >60 02/16/2022    GLUCOSE 102 02/16/2022    PROT 6.9 02/16/2022    LABALBU 4.2 02/16/2022    CALCIUM 9.3 02/16/2022    BILITOT 0.3 02/16/2022    ALKPHOS 66 02/16/2022    AST 16 02/16/2022    ALT 17 02/16/2022     HgBA1c:    Lab Results   Component Value Date    LABA1C 6.0 02/15/2022     FLP:    Lab Results   Component Value Date    TRIG 113 02/15/2022    HDL 46 02/15/2022    LDLCALC 84 02/15/2022    LABVLDL 23 02/15/2022     CT Head:  No acute intracranial hemorrhage or mass effect. No CT evidence of large acute infarct.     CT Perfusion:  There is 38 cc of parenchyma in the left superior posterior frontal lobe with  mild ischemia (T-max > 4 seconds).  No evidence of T-max > 6 seconds.  No  perfusion evidence of infarct core.     CTA Neck:  No hemodynamically significant stenosis or dissection of the cervical  internal carotid arteries. No high-grade stenosis or dissection of the cervical vertebral arteries.     CTA Head:  No evidence of proximal large vessel arterial occlusion or high-grade  stenosis. Repeat CTH s/p tPA 24 hr: negative for stroke     I independently reviewed the labs and imaging studies today.      Assessment:     Stroke aborted by tPA administration in ED  --- facial droop, slurred speech and difficulty swallowing most likely related to an ischemic infarct  --- possible embolic origin given his cardiac history is as well as the improvement post IV TPA  --- LDL 84 and A1c 6.0  --- stroke risk factors include: History of ACB and aortic valve replacement, DION, Hypertension, hyperlipidemia    Plan:     DAPT-aspirin 81 mg and Plavix 75 mg  Continue Crestor goal LDL less than 70  PT/OT/SLP  Echo with bubble can be completed as OP  Follow up with stroke clinic  Most likely will need cardiac event monitoring on discharge-this was ordered--[facility]  Neurology to sign off       JOHNATHAN Parnell CNP  12:23 PM  2/16/2022

## 2022-02-16 NOTE — PLAN OF CARE
Applied Zio AT monitor for 14 days. Serial number [id number]. Instructed patient to avoid showering in the first 24 hours. Press the button on the monitor when you feel a symptom and write it in your diary. Do not submerge in water. No lotion or power near the patch. Please return the monitor in the box provided. Patient verbalized understanding. Ely notified to register the device.

## 2022-02-16 NOTE — PROGRESS NOTES
Physical Therapy  Physical Therapy Initial Assessment     Name: Familia العراقي  : 1960  MRN: [id number]      Date of Service: 2022    Evaluating PT:  Brenda Willingham, PT, DPT [id number]    Room #:  4696/5750-B  Diagnosis:  Acute ischemic stroke [facility]) [I63.9]  PMHx/PSHx:  Asthma, HLD, HTN, thyroid disease, Chronic LBP, Bilateral hip pain   Procedure/Surgery:  tPA (22)  Precautions:  SBP < 180, DBP < 90  Equipment Needs:  Cane- already owns     SUBJECTIVE:    Pt lives with wife in a ranch style home with basement. There is a full flight of stairs and 1 rail to get to basement level. There are 3 stairs to enter and no rail. Pt ambulated with cane for long distances and independent PTA. OBJECTIVE:   Initial Evaluation  Date: 36   AM-PAC 6 Clicks 34/51   Was pt agreeable to Eval/treatment? Yes   Does pt have pain? No reported pain   Bed Mobility  Rolling: NT  Supine to sit: NT  Sit to supine: NT  Scooting: NT   Transfers Sit to stand: Independent   Stand to sit: Independent   Stand pivot: NT   Ambulation   400 feet with Mod [place] and SPC    Stair negotiation: ascended and descended 10 steps with 1 rail Mod Independent    ROM BUE:  Defer to OT note  BLE:  WFL   Strength BUE:  Defer to OT note  BLE:  5/5   Balance Sitting EOB:  NT  Dynamic Standing:   Mod Independent with SPC     Pt is A & O x 4  CAM-ICU: NT  RASS: 0  Sensation:  No paraesthesias reported  Edema:  None     Vitals:  Blood Pressure at rest 137/92 mmHg Blood Pressure post session 130/91 mmHg       Functional Status Score-Intensive Care Unit (FSS-ICU)   Rolling -/7   Supine to sit transfer -/   Unsupported sitting     Sit to stand transfers    Ambulation    Total         Therapeutic Exercises:  n/a    Patient education  Pt educated on role of PT    Patient response to education:   Pt verbalized understanding Pt demonstrated skill Pt requires further education in this area   x x x     ASSESSMENT:    Conditions treatments: 2-5x/week x 1-2 weeks. Time in  0935  Time out  0957      Evaluation Time includes thorough review of current medical information, gathering information on past medical history/social history and prior level of function, completion of standardized testing/informal observation of tasks, assessment of data and education on plan of care and goals.     CPT codes:  [x] Low Complexity PT evaluation 11918  [] Moderate Complexity PT evaluation 21217  [] High Complexity PT evaluation 74804  [] PT Re-evaluation 99093  [] Gait training 56316 - minutes  [] Manual therapy 58838 - minutes  [] Therapeutic activities 59299 - minutes  [] Therapeutic exercises 92009 - minutes  [] Neuromuscular reeducation 78988 - minutes     [facility], [facility], [address] [place] [address]  [id number]

## 2022-02-16 NOTE — PROGRESS NOTES
SPEECH/LANGUAGE PATHOLOGY  CLINICAL ASSESSMENT OF SWALLOWING FUNCTION   and PLAN OF CARE  PATIENT NAME:  Familia Mercedes  (male)     MRN:  [id number]    :  1960  (57 y.o.)  STATUS:  Inpatient: Room 45/4522-A    TODAY'S DATE:  2022  02/15/22 1000   Speech Language Pathology (SLP) eval and treat Start: 02/15/22 1000, End: 02/15/22 1000, ONE TIME, Standing Count: 1 Occurrences, R    JELANI Lugo    REASON FOR REFERRAL: Assessment of oropharyngeal swallow function   EVALUATING THERAPIST: Duran Mckenna                 ASSESSMENT:    DYSPHAGIA DIAGNOSIS:   Clinical indicators of normal swallow function      DIET RECOMMENDATIONS:  Regular consistency solids (IDDSI level 7) with  thin liquids (IDDSI level 0)     FEEDING RECOMMENDATIONS:     Assistance level:  No assistance needed      Compensatory strategies recommended: No strategies are recommended at this time      Discussed recommendations with nursing?: No secondary to no diet/liquid change recommended     SPEECH THERAPY  PLAN OF CARE   The dysphagia POC is established based on physician order, dysphagia diagnosis and results of clinical assessment     Dysphagia therapy is not recommended     Conditions Requiring Skilled Therapeutic Intervention for dysphagia:    not applicable    Specific dysphagia interventions to include:     Not applicable    Specific instructions for next treatment:  not applicable   Patient Treatment Goals:    Short Term Goals:  Not applicable no therapy warranted     Long Term Goals:   Not applicable no therapy warranted      Patient/family Goal:    not applicable                    ADMITTING DIAGNOSIS: Acute ischemic stroke ([facility] [facility] [I63.9]    VISIT DIAGNOSIS:      PATIENT REPORT/COMPLAINT: denies difficulty swallowing at the time. Primary complaint upon admission was difficulty swallowing.   RN cleared patient for participation in assessment     yes     PRIOR LEVEL OF SWALLOW FUNCTION:    PAST HISTORY OF DYSPHAGIA?: none reported    Home diet: Regular consistency solids (IDDSI level 7) with  thin liquids (IDDSI level 0)  PROCEDURE:  Consistencies Administered During the Evaluation   Liquids: thin liquid   Solids:  pureed foods and soft solid foods      Method of Intake:   cup, spoon  Self fed      Position:   Seated, upright    CLINICAL ASSESSMENT  Oral Stage: The oral stage of swallowing was within functional limits      Pharyngeal Stage:    No signs of aspiration were noted during this evaluation however, silent aspiration cannot be ruled out at bedside. If silent aspiration is suspected, a Videofluoroscopic Study of Swallowing (MBS) is recommended and requires a physician order. Cognition:   Within functional limits for this exam    Oral Peripheral Examination   Adequate lingual/labial strength     Current Respiratory Status    room air     Parameters of Speech Production  Respiration:  Adequate for speech production  Quality:   Within functional limits  Intensity: Within functional limits    Volitional Swallow: Present    Volitional Cough:    Present    Pain: No pain reported. EDUCATION:   The Speech Language Pathologist (SLP) completed education regarding results of evaluation and that intervention is not warranted at this time. Learner: Patient and Significant Other  Education:  Reviewed results and recommendations of this evaluation and Reviewed signs, symptoms and risks of aspiration  Evaluation of Education: Verbalizes understanding    This plan will be re-evaluated and revised in 1 week  if warranted. CPT code:  85778  bedside swallow eval      [x]The admitting diagnosis and active problem list, have been reviewed prior to initiation of this evaluation. ACTIVE PROBLEM LIST:   Patient Active Problem List   Diagnosis    Aortic stenosis    Bradycardia    [facility]           Taisha MORTENSEN   Speech Language Pathology

## 2022-02-16 NOTE — PROGRESS NOTES
[address]       Date:2022                                                               Patient Name: Kasys Mitchell  MRN: [id number]  : 1960  Room: [address] [place] [address]    Evaluating OT: Anamaria Delgado OTR/L 2221    Referring Provider: JELANI Boyer   Specific Provider Orders/Date: OT eval and treat (2/15/22)       Diagnosis: Acute Ischemic Stroke     Reason for admission: onset of R sided weakness; slurred speech & facial doop    Surgery/Procedures:  tPA     Pertinent Medical History:  Aortic Stenosis, Asthma, Bradycardia, HLD< HTN, Sleep apnea, thyroid disease     *Precautions:  Fall Risk, SBP<180    Assessment of current deficits   [x] Functional mobility  []ADLs  [x] Strength               []Cognition   [] Functional transfers   [x] IADLs         [x] Safety Awareness   [x]Endurance   [] Fine Coordination        [] ROM     [] Vision/perception   []Sensation    []Gross Motor Coordination [] Balance   [] Delirium                  []Motor Control     [] Communication     Modified [place]osa Scale   Score     Description  0             No symptoms  1             No significant disability despite symptoms  2             Slight disability; able to look after own affairs  3             Moderate disability; able to ambulate without assist/ requires assist with ADLs  4             Moderate/Severe disability;requires assist to ambulate/assist with ADLs  5             Severe disability;bedridden/incontinent   6               Score:   0    Recommended Adaptive Equipment: shower seat as needed for energy conservation      Home Living: Pt lives with wife  in a ranch style home with 3 step(s) to enter and no rail(s); bed/bath on first floor  Bathroom setup: walk in shower with higher commode seat  Equipment owned: Cane for distances    Prior Level of Function: Min A with LB ADLs;  IND with IADLs. No device for ambulation; cane PRN for distance. Driving: yes  Occupation: Disabled     Pain Level: pt c/o 0/10  pain  this session    Cognition: A&O: 4/4    Follows 1-2 step commands appropriately. Memory: good   Comprehension good   Problem solving: good   Judgement/safety: good               Communication skills: WFL           Vision: WFL; denies vision changes               Glasses:yes                                                   Hearing: WFL     RASS: 0  CAM-ICU: (NT) Delirium    UE Assessment:  Hand Dominance: Right [x]  Left []     ROM Strength   RUE  WFL 5/5   LUE WFL 5/5     Sensation: No c/o numbness/tingling in  extremities. Tone: WNL   Edema: WFL     Functional Assessment:  AM-PAC Daily Activity Raw Score: 22/24   Initial Eval Status  Date: 2/16   Feeding IND   Grooming IND   standing sink level   UB dressing/bathing IND   LB dressing/bathing Min A  (wife assists with socks/shoes)  *Review of AE if interested    Toileting NT   Bed Mobility  Supine to sit:   NT    Sit to supine:   NT   Functional Transfers Sit to stand:   IND    Stand to sit:   IND   Functional Mobility IND  No device   Balance Sitting:     Static:  IND    Dynamic:IND  Standing: IND   Endurance/Activity Tolerance   G tolerance with moderate activity. Visual/  Perceptual   WFL                 Vitals:   HR at rest: 54 bpm HR at end of session: 62 bpm   Spo2 at rest:96% Spo2 at end of session 99%   BP at rest:137/92 mmHg BP at end of session 139/91 mmHg       Treatment: OT treatment provided this date includes:    Energy conservation: Education on breathing techniques, pacing, work simplification strategies & recommended bathroom DME, LB dressing AE for safety and energy conservation during self care tasks and activities of daily living. Line management and environmental modifications made prior to and end of session to ensure patient safety and to increase efficiency of session.   Skilled monitoring of HR, O2 saturation, blood pressure and patient's response to activity performed throughout session. Comments: OK from RN to see patient. Upon arrival, patient sitting up in chair, agreeable to session. Wife present. Pt demo good tolerance with good understanding of education/techniques. At end of session, patient left seated in chair to increase activity tolerance. Call light within reach, all lines and tubes intact. Pt instructed on use of call light for assistance and fall prevention. .    No anticipated OT needs at this time. Will discontinue pt from OT caseload. Patient / Family Goal: to return to PLOF    Patient and/or family were instructed/educated on diagnosis, prognosis/goals and plan of care. Pt demonstrated G understanding. Evaluation Complexity:low    Time TB:6855             Time Out: 1005          Min Units   OT Eval Low 37531 X    OT Eval Medium 89566     OT Eval High [id number]     OT Re-Eval [id number]     Therapeutic Ex [id number]     Therapeutic Activities 43711     ADL/Self Care 29004     Orthotic Management 71191     Neuro Re-Ed 84503     Non-Billable Time        Evaluation time includes thorough review of current medical information, gathering information on past medical history/social history and prior level of function, completion of standardized testing/informal observation of tasks, assessment of data and development of POC/Goals.      Kristan Mohan, OTR/L 0044

## 2022-02-16 NOTE — DISCHARGE INSTR - DIET

## 2022-02-16 NOTE — PROGRESS NOTES
SPEECH/LANGUAGE PATHOLOGY  SPEECH/LANGUAGE/COGNITIVE EVALUATION   and PLAN OF CARE      PATIENT NAME:  Geri Malcolm  (male)     MRN:  [id number]    :  1960  (57 y.o.)  STATUS:  Inpatient: Room 4522/4522-A    TODAY'S DATE:  2022  02/15/22 1000   Speech Language Pathology (SLP) eval and treat Start: 02/15/22 1000, End: 02/15/22 1000, ONE TIME, Standing Count: 1 Occurrences, R    JELANI Dean    REASON FOR REFERRAL:  Assessment of cognitive linguistic function  EVALUATING THERAPIST: Rodger Ceja    ADMITTING DIAGNOSIS: Acute ischemic stroke ([facility] [address] [I63.9]    VISIT DIAGNOSIS:      SPEECH THERAPY  PLAN OF CARE   The speech therapy  POC is established based on physician order, speech pathology diagnosis and results of clinical assessment     SPEECH PATHOLOGY DIAGNOSIS:    Mild memory deficits. Consult with PCP regarding ongoing speech and language therapy as needed. Speech Pathology intervention is recommended 3-6 times per week for LOS or when goals are met with emphasis on the following:      Conditions Requiring Skilled Therapeutic Intervention for speech, language and/or cognition    Decreased short term memory    Specific Speech Therapy Interventions to Include: Therapeutic tasks for memory    Specific instructions for next treatment: To initiate POC    SHORT/LONG TERM GOALS  Pt will improve immediate, short term, recent memory during structured and unstructured tasks with 80% accuracy     Patient goals: Patient/family involved in developing goals and treatment plan:   Treatment goals discussed with Patient and Family    The Patient and Family understand(s) the diagnosis, prognosis and plan of care   The patient/family Agreed with above,     This plan may be re-evaluated and revised as warranted.         Rehabilitation Potential/Prognosis: good                CLINICAL ASSESSMENT:  MOTOR SPEECH       Oral Peripheral Examination   Adequate lingual/labial strength     Parameters of Speech Production  Respiration:  Adequate for speech production  Articulation:  Within functional limits  Resonance:  Within functional limits  Quality:   Within functional limits  Pitch: Within functional limits  Intensity: Within functional limits  Fluency:  Intact  Prosody Intact    RECEPTIVE LANGUAGE    Comprehension of Yes/No Questions: Within functional limits    Process  Simple Verbal Commands:   Within functional limits  Process Intermediate Verbal Commands:   Within functional limits  Process Complex Verbal Commands:     Within functional limits    Comprehension of Conversation:      Within functional limits      EXPRESSIVE LANGUAGE     Serials: Functional    Imitation:  Words   Functional   Sentences Functional    Naming:  (Modality used:  Verbal)  Confrontation Naming  Functional  Response Naming: Functional    Conversation:      Conversation was within functional limits    COGNITION     Attention/Orientation  Attention: Sustained attention   Orientation:  Oriented to Person, Place, Date, Reason for hospitalization    Memory   Immediate Recall: Repeated 3/3    Delayed Recall:   Recalled 0/3    Long Term Recall:   Recalled Address, Birthdate, Age and Family    Organization/Problem Solving/Reasoning   Verbal Sequencing:   Functional        Verbal Problem solving:   Functional          CLINICAL OBSERVATIONS NOTED DURING THE EVALUATION  Within functional limits                  EDUCATION:   The Speech Language Pathologist (SLP) completed education regarding results of evaluation and that intervention is warranted at this time.   Learner: Patient and Significant Other  Education: Reviewed results and recommendations of this evaluation  Evaluation of Education:  Verbalizes understanding    Evaluation Time includes thorough review of current medical information, gathering information on past medical history/social history and prior level of function, completion of standardized testing/informal observation of tasks, assessment of data and education on plan of care and goals. CPT code:    72217  eval speech sound lang comprehension      The admitting diagnosis and active problem list, as listed below have been reviewed prior to initiation of this evaluation. ACTIVE PROBLEM LIST:   Patient Active Problem List   Diagnosis    Aortic stenosis    Bradycardia    Acute ischemic stroke ([facility] [address]       Ani MORTENSEN   Speech Language Pathology

## 2022-02-16 NOTE — H&P
Fernando Luz M.D. History and Physical      CHIEF COMPLAINT: Difficulty getting words out with acute right-sided facial droop    Reason for Admission: Acute stroke    History Obtained From: Patient/EMR    HISTORY OF PRESENT ILLNESS:      The patient is a 57 y.o. male of Jude Gutierrez MD with significant past medical history of morbid obesity, hypertension hyperlipidemia who presents with complaints of right-sided facial droop and not being able to form words and speaking gibberish. He presented to [facility] emergency department and secondary to acute findings, underwent IV TPA and subsequently transferred to [facility] for further evaluation and neuro intensive care. Patient states after he received the TPA he had complete resolution of his symptoms. On my eval he has fluid speech able to answer all questions appropriately. No focal motor or sensory deficits. Post TPA follow-up CT head is unremarkable. Patient has been initiated on appropriate medications including blood pressure control and high intensity statin. He actually feels great and is anxious for discharge home.           All labs personally reviewed   All imaging personally reviewed     Past Medical History:        Diagnosis Date    Aortic stenosis 2/18/2013    Asthma     Bradycardia 2/18/2013    Hx of vasectomy     Hyperlipidemia     Hypothyroidism     Sleep apnea     Thyroid disease      Past Surgical History:        Procedure Laterality Date    VASECTOMY           Medications Prior to Admission:    Medications Prior to Admission: PROVENTIL  (90 Base) MCG/ACT inhaler,   metoprolol tartrate (LOPRESSOR) 25 MG tablet, 25 mg 2 times daily   Multiple Vitamin (MULTI-VITAMIN DAILY PO), Take by mouth daily  aspirin 81 MG tablet, Take 81 mg by mouth daily  CPAP Machine MISC, by Does not apply route  levothyroxine (SYNTHROID) 137 MCG tablet, Take 150 mcg by mouth Daily   FLUTICASONE FUROATE NA, 2 sprays by Nasal route daily. rosuvastatin (CRESTOR) 10 MG tablet, Take 10 mg by mouth daily. Cholecalciferol (VITAMIN D) 2000 UNITS CAPS capsule, Take 4,000 capsules by mouth daily   albuterol (PROVENTIL) (2.5 MG/3ML) 0.083% nebulizer solution, Take 2.5 mg by nebulization every 6 hours as needed. Allergies:  Patient has no known allergies. Social History:   TOBACCO:   reports that he quit smoking about 14 years ago. His smoking use included cigarettes. He has a 7.50 pack-year smoking history. He has never used smokeless tobacco.  ETOH:   reports no history of alcohol use. MARITAL STATUS:    OCCUPATION:      Family History:       Problem Relation Age of Onset    Heart Disease Mother     Emphysema Father        REVIEW OF SYSTEMS:    General ROS: Difficulty getting words out, gibberish when speaking, facial droop  Hematological and Lymphatic ROS: negative  Endocrine ROS: negative  Respiratory ROS: no cough, shortness of breath, or wheezing  Cardiovascular ROS: no chest pain or dyspnea on exertion  Gastrointestinal ROS: no abdominal pain, change in bowel habits, or black or bloody stools  Genito-Urinary ROS: no dysuria, trouble voiding, or hematuria  Neurological ROS: no TIA or stroke symptoms  negative    Vitals:  BP (!) 146/84   Pulse 64   Temp 98.1 °F (36.7 °C) (Oral)   Resp 20   Ht 5' 8\" (1.727 m)   Wt 286 lb (129.7 kg)   SpO2 99%   BMI 43.49 kg/m²     PHYSICAL EXAM:  General:  Awake, alert, oriented X 3. Well developed, well nourished, well groomed. No apparent distress. HEENT: Slight facial droop-very subtle to me normocephalic, atraumatic. Pupils equal, round, reactive to light. No scleral icterus. No conjunctival injection. Normal lips, teeth, and gums. No nasal discharge. Neck:  Supple, FROM  Heart:  RRR, no murmurs, gallops, rubs, carotid upstroke normal, no carotid bruits  Lungs:  CTA bilaterally, bilat symmetrical expansion, no wheeze, rales, or rhonchi  Abdomen:   Bowel sounds present, soft, nontender, no masses, no organomegaly, no peritoneal signs  Extremities:  No clubbing, cyanosis, or edema  Skin:  Warm and dry, no open lesions or rash  Neuro:  Cranial nerves 2-12 intact, no focal deficits  Vascular: Radial and pedal Pulses 2+  Breast: deferred  Rectal: deferred  Genitalia:  deferred      DATA:     Recent Labs     02/14/22  1934 02/15/22  1110   WBC 7.6 7.6   HGB 14.6 13.8    147     Recent Labs     02/14/22  2002 02/15/22  1110    143   K 4.3 3.9   BUN 20 16   CREATININE 1.1 1.1     Recent Labs     02/14/22  1934 02/14/22  2002 02/15/22  1110   PROT  --  6.7 6.9   INR 1.0  --   --      Recent Labs     02/14/22  2002 02/15/22  1110   AST 16 20   ALT 20 18   ALKPHOS 66 65   BILITOT <0.2 0.4     No results for input(s): BNP in the last 72 hours. No results for input(s): CKTOTAL, CKMB, CKMBINDEX, TROPONINI in the last 72 hours. ASSESSMENT:      Active Problems:    Acute ischemic stroke [facility])  Resolved Problems:    * No resolved hospital problems. *        PLAN:    Admitted to neuro intensive care for observation status post IV TPA for acute stroke, without large vessel involvement.   Patient with known history of coronary artery disease status post CABG remotely along with hypertension hyperlipidemia and morbid obesity    Continue to observe in ICU setting for 24 hours post IV TPA  No antiplatelet/anticoagulation for this period of time after which she can be started on antiplatelet  High intensity statin  Check hemoglobin A1c lipid panel  Monitor blood pressure closely-adjust medications as appropriate for optimal control  Risk factor modifications discussed with patient and wife including weight loss  Neurology evaluation pending on my assessment of patient  He will need an echocardiogram prior to discharge given his cardiac history and would also recommend outpatient telemetry monitoring for A. fib      DVT prophylaxis  PT OT  Discharge plan    Linda Fischer MD  2/15/2022  9:22 PM

## 2022-02-16 NOTE — DISCHARGE INSTR - COC
Continuity of Care Form    Patient Name: Brian Heart   :  1960  MRN:  [id number]    Admit date:  2/15/2022  Discharge date:  ***    Code Status Order: Full Code   Advance Directives:      Admitting Physician:  Judy Ma MD  PCP: Noe Rosenbaum MD    Discharging Nurse: [facility] Unit/Room#: 9820/0239-F  Discharging Unit Phone Number: ***    Emergency Contact:   Extended Emergency Contact Information  Primary Emergency Contact: Crystal Todd  Address: [address] Phone: [phone]  Mobile Phone: [phone]  Relation: Spouse    Past Surgical History:  Past Surgical History:   Procedure Laterality Date    VASECTOMY         Immunization History:   Immunization History   Administered Date(s) Administered    COVID-19, J&J, PF, 0.5 mL 2021       Active Problems:  Patient Active Problem List   Diagnosis Code    Aortic stenosis I35.0    Bradycardia R00.1    Acute ischemic [facility] I63.9       Isolation/Infection:   Isolation            No Isolation          Patient Infection Status       None to display            Nurse Assessment:  Last Vital Signs: BP (!) 130/91   Pulse 57   Temp 97.8 °F (36.6 °C) (Oral)   Resp 16   Ht 5' 8\" (1.727 m)   Wt 286 lb (129.7 kg)   SpO2 98%   BMI 43.49 kg/m²     Last documented pain score (0-10 scale): Pain Level: 0  Last Weight:   Wt Readings from Last 1 Encounters:   02/15/22 286 lb (129.7 kg)     Mental Status:  {IP PT MENTAL STATUS:}    IV Access:  { MASTER IV ACCESS:458759458}    Nursing Mobility/ADLs:  Walking   {CHP DME VCZY:338609274}  Transfer  {CHP DME EWWX:179223417}  Bathing  {CHP DME EULR:004410815}  Dressing  {CHP DME NYUV:168043447}  Toileting  {CHP DME JPDD:969807020}  Feeding  {CHP DME DJOY:536612374}  Med Admin  {CHP DME EEQT:736751863}  Med Delivery   { MASTER MED Delivery:284118457}    Wound Care Documentation and Therapy:        Elimination:  Continence: Bowel: {YES / RX:35892}  Bladder: {YES / C}  Urinary Catheter: {Urinary Catheter:418685302}   Colostomy/Ileostomy/Ileal Conduit: {YES / FJ:20761}       Date of Last BM: ***    Intake/Output Summary (Last 24 hours) at 2022 1043  Last data filed at 2/15/2022 2022  Gross per 24 hour   Intake 635 ml   Output 1450 ml   Net -815 ml     I/O last 3 completed shifts: In: 587 [P.O.:625;  I.V.:10]  Out: 1450 [Urine:1450]    Safety Concerns:     { MASTER Safety Concerns:316228669}    Impairments/Disabilities:      { MASTER Impairments/Disabilities:808481111}    Nutrition Therapy:  Current Nutrition Therapy:   [facility] MASTER Diet List:723584275}    Routes of Feeding: {P DME Other Feedings:052787188}  Liquids: {Slp liquid thickness:31939}  Daily Fluid Restriction: {CH DME Yes amt example:048508370}  Last Modified Barium Swallow with Video (Video Swallowing Test): {Done Not Done VJVC:389050794}    Treatments at the Time of Hospital Discharge:   Respiratory Treatments: ***  Oxygen Therapy:  {Therapy; copd oxygen:65854}  Ventilator:    { CC Vent XLVB:960201895}    Rehab Therapies: {THERAPEUTIC INTERVENTION:1259515335}  Weight Bearing Status/Restrictions: [facility] Weight Bearin}  Other Medical Equipment (for information only, NOT a DME order):  {EQUIPMENT:421839145}  Other Treatments: ***    Patient's personal belongings (please select all that are sent with patient):  {[facility] DME Belongings:828815630}    RN SIGNATURE:  {Esignature:172268629}    CASE MANAGEMENT/SOCIAL WORK SECTION    Inpatient Status Date: ***    Readmission Risk Assessment Score:  Readmission Risk              Risk of Unplanned Readmission:  10           Discharging to Facility/ Agency   Name:   Address:  Phone:  Fax:    Dialysis Facility (if applicable)   Name:  Address:  Dialysis Schedule:  Phone:  Fax:    / signature: {Esignature:552101862}    PHYSICIAN SECTION    Prognosis: {Prognosis:1723467264}    Condition at Discharge: [facility] Patient Condition:855484159}    Rehab Potential (if transferring to Rehab): {Prognosis:5188321080}    Recommended Labs or Other Treatments After Discharge: ***    Physician Certification: I certify the above information and transfer of Nancy Whelan  is necessary for the continuing treatment of the diagnosis listed and that he requires {Admit to Appropriate Level of Care:60813} for {GREATER/LESS:069884914} 30 days.      Update Admission H&P: {CHP DME Changes in TQSAV:202402663}    PHYSICIAN SIGNATURE:  {Esignature:181925709}

## 2022-02-16 NOTE — PROGRESS NOTES
Intensive Care Unit  Critical Care Consult  Daily Progress Note 2/16/2022    Date of Admission: 2/15/2022    CC: Critical care management s/p tPA     Hospital Course/ Overnight Events:  57year old male presented to [facility] with strokelike symptoms. R sided facial droop, slurred speech, R leg weakness. Negative for blood thinners. Hx: CABG several years ago. LKW 1858. Stroke alert called 1922, NIH=4, Glucose- 135, /89. tPA given 2025. CT head no acute ICH or mass. CT perfusion- 38 cc of parenchyma L superior posterior frontal lobe with mild ischemia. 2/15 Transferred to [facility]. E's main for further management. Patient with no complaints, walked to bed at 1000. New lab work pending. CT pending. MRI pending. [facility] physician is Niyah Gifford. 2/16 No acute events overnight. ECHO pending. Possibly cardiac monitoring x 2 weeks as outpatient. Waiting to get discharged. No PRN antihypertensives given. PHYSICAL EXAM:    BP (!) 130/91   Pulse 57   Temp 97.8 °F (36.6 °C) (Oral)   Resp 16   Ht 5' 8\" (1.727 m)   Wt 286 lb (129.7 kg)   SpO2 98%   BMI 43.49 kg/m²     Intake/Output Summary (Last 24 hours) at 2/16/2022 1141  Last data filed at 2/15/2022 2022  Gross per 24 hour   Intake 635 ml   Output 1450 ml   Net -815 ml        General appearance:  Comfortable. Pain Description: none    GCS:  15  4 - Opens eyes on own   6 - Follows simple motor commands  5 - Alert and oriented x 4  - answers all questions appropriately    Pupil size: Left 3 mm  Right 3 mm  Pupil reaction: Yes  Wiggles fingers: Left Yes Right Yes  Hand grasp:   Left normal     Right normal  Wiggles toes: Left Yes    Right Yes  Plantar flexion: Left normal    Right normal    CONSTITUTIONAL: No acute distress, lying in hospital bed. NEUROLOGIC: PERRL, oriented x 4  CARDIOVASCULAR: S1 S2, regular rate, regular rhythm, no murmur/gallop/rub. Monitor: SR/SB  PULMONARY: Bilaterally clear, diminished.  No rhonchi/rales/wheezes, no use of accessory muscles. RENAL: Voids. ABDOMEN: soft, nontender, nondistended, nontympanic, normal bowel sounds. BM 2/15  MUSCULOSKELETAL: moves all extremities purposefully, 5/5 strength. Chronic pain hips, back, shoulders- retired . SKIN/EXTREMITIES: no rashes/ecchymosis, no edema/clubbing, warm/dry, good capillary refill     LINES: PIV    CONSULTS:   Medicine  Neurology    Past Medical History:   Diagnosis Date    Aortic stenosis 2/18/2013    Asthma     Bradycardia 2/18/2013    Hx of vasectomy     Hyperlipidemia     Hypothyroidism     Sleep apnea     Thyroid disease        ASSESSMENT/PLAN:       · Neuro: Monitor neuro status,  Neurology following, MRI pending. CT scan pending  · CV: No acute issues. Hx Aortic stenosis, Sinus bradycardia with 1st degree AV block , Aortic valve replacement (pig) 2018. - Patient does not always take. HLD. Monitor hemodynamics. BP goal less than 180 mm Hg. PRN hydralazine. Statin. ECHO pending. Metoprolol   · Pulm: No acute issues. Hx: Asthma, Sleep apnea, previous smoker. CPAP at night. Monitor RR & SpO2. Flonase   · GI: No acute issues. . Diet. Monitor bowel function. Speech . Tigan PRN   · Renal: No acute issues. Hx: Vasectomy. Monitor BUN & Cr. Monitor electrolytes & replace as needed. Monitor urine output. Voids    · ID: No acute issues. Monitor WBC  · Endocrine: No acute issues. Hx: hypothyroid. Levothyroxine. A1C- 6.0  · MSK: No acute issues. ROM. turn & reposition. PT/OT . Cane- R side , long distance. · Heme: No acute issues. Monitor CBC. Bowel regimen: Glycolax, Senna    Pain control/Sedation: Tylenol  DVT prophylaxis: SCD  GI prophylaxis: Diet  Ancillary consults:  Neurology, Medicine and Critical Care  Patient/Family update: Questions answered. Wife at the bedside. Support given. Will update when available.     Code status:  Full    Disposition: Possible discharge to home - awaiting Cardiac monitor     Electronically signed by JOHNATHAN Anne

## 2022-02-16 NOTE — PLAN OF CARE
Problem: Falls - Risk of:  Goal: Will remain free from falls  Description: Will remain free from falls  Outcome: Completed     Problem: Falls - Risk of:  Goal: Absence of physical injury  Description: Absence of physical injury  Outcome: Completed     Problem: Cardiac:  Goal: Ability to maintain vital signs within normal range will improve  Description: Ability to maintain vital signs within normal range will improve  Outcome: Completed     Problem: Cardiac:  Goal: Cardiovascular alteration will improve  Description: Cardiovascular alteration will improve  Outcome: Completed     Problem: Health Behavior:  Goal: Will modify at least one risk factor affecting health status  Description: Will modify at least one risk factor affecting health status  Outcome: Completed     Problem: Health Behavior:  Goal: Identification of resources available to assist in meeting health care needs will improve  Description: Identification of resources available to assist in meeting health care needs will improve  Outcome: Completed     Problem: Physical Regulation:  Goal: Complications related to the disease process, condition or treatment will be avoided or minimized  Description: Complications related to the disease process, condition or treatment will be avoided or minimized  Outcome: Completed

## 2022-03-08 ENCOUNTER — TELEPHONE (OUTPATIENT)
Dept: NEUROLOGY | Age: 62
End: 2022-03-08

## 2022-03-09 DIAGNOSIS — I63.9 ACUTE ISCHEMIC STROKE (HCC): ICD-10-CM

## 2022-12-09 ENCOUNTER — HOSPITAL ENCOUNTER (OUTPATIENT)
Age: 62
Discharge: HOME | End: 2022-12-11

## 2022-12-09 PROCEDURE — 88305 TISSUE EXAM BY PATHOLOGIST: CPT

## 2022-12-09 PROCEDURE — 87081 CULTURE SCREEN ONLY: CPT

## 2022-12-10 LAB — CLOTEST: NORMAL

## 2023-05-03 ENCOUNTER — HOSPITAL ENCOUNTER (OUTPATIENT)
Age: 63
Discharge: HOME | End: 2023-05-05

## 2025-05-19 NOTE — TELEPHONE ENCOUNTER
Left message at Dr. Diggs Notice office at [facility] requesting CABG report and any other testing performed for our records.
Records received from [facility].   Patient contacted and appointment scheduled for 9/24/2018 at 2 pm
[facility] calling and req 2 week HFU with Dr. Be Agustin for pt - s/p CABG. Please call pt for hfu apt.
Detail Level: Zone